# Patient Record
Sex: MALE | Race: WHITE | Employment: FULL TIME | URBAN - METROPOLITAN AREA
[De-identification: names, ages, dates, MRNs, and addresses within clinical notes are randomized per-mention and may not be internally consistent; named-entity substitution may affect disease eponyms.]

---

## 2017-01-03 RX ORDER — HYDROCODONE BITARTRATE AND ACETAMINOPHEN 5; 325 MG/1; MG/1
1 TABLET ORAL 3 TIMES DAILY PRN
Qty: 90 TABLET | Refills: 0 | Status: SHIPPED | OUTPATIENT
Start: 2017-01-03 | End: 2017-02-01 | Stop reason: SDUPTHER

## 2017-02-02 RX ORDER — HYDROCODONE BITARTRATE AND ACETAMINOPHEN 5; 325 MG/1; MG/1
1 TABLET ORAL 3 TIMES DAILY PRN
Qty: 90 TABLET | Refills: 0 | Status: SHIPPED | OUTPATIENT
Start: 2017-02-02 | End: 2017-03-02 | Stop reason: SDUPTHER

## 2017-02-21 RX ORDER — METFORMIN HYDROCHLORIDE 500 MG/1
500 TABLET, EXTENDED RELEASE ORAL 2 TIMES DAILY WITH MEALS
Qty: 180 TABLET | Refills: 3 | Status: SHIPPED | OUTPATIENT
Start: 2017-02-21 | End: 2019-02-15

## 2017-02-27 ENCOUNTER — TELEPHONE (OUTPATIENT)
Dept: INTERNAL MEDICINE CLINIC | Age: 62
End: 2017-02-27

## 2017-02-28 ENCOUNTER — OFFICE VISIT (OUTPATIENT)
Dept: INTERNAL MEDICINE CLINIC | Age: 62
End: 2017-02-28

## 2017-02-28 VITALS
DIASTOLIC BLOOD PRESSURE: 70 MMHG | BODY MASS INDEX: 40.11 KG/M2 | HEART RATE: 100 BPM | SYSTOLIC BLOOD PRESSURE: 130 MMHG | RESPIRATION RATE: 14 BRPM | WEIGHT: 263.8 LBS

## 2017-02-28 DIAGNOSIS — Z79.4 TYPE 2 DIABETES MELLITUS WITH COMPLICATION, WITH LONG-TERM CURRENT USE OF INSULIN (HCC): Primary | ICD-10-CM

## 2017-02-28 DIAGNOSIS — E11.8 TYPE 2 DIABETES MELLITUS WITH COMPLICATION, WITH LONG-TERM CURRENT USE OF INSULIN (HCC): Primary | ICD-10-CM

## 2017-02-28 DIAGNOSIS — M79.2 PERIPHERAL NEUROPATHIC PAIN: ICD-10-CM

## 2017-02-28 DIAGNOSIS — E78.2 MIXED HYPERLIPIDEMIA: ICD-10-CM

## 2017-02-28 DIAGNOSIS — I25.10 ASHD (ARTERIOSCLEROTIC HEART DISEASE): ICD-10-CM

## 2017-02-28 PROCEDURE — 99213 OFFICE O/P EST LOW 20 MIN: CPT | Performed by: INTERNAL MEDICINE

## 2017-03-02 ENCOUNTER — TELEPHONE (OUTPATIENT)
Dept: INTERNAL MEDICINE CLINIC | Age: 62
End: 2017-03-02

## 2017-03-02 DIAGNOSIS — M54.42 CHRONIC BILATERAL LOW BACK PAIN WITH LEFT-SIDED SCIATICA: Primary | ICD-10-CM

## 2017-03-02 DIAGNOSIS — G89.29 CHRONIC BILATERAL LOW BACK PAIN WITH LEFT-SIDED SCIATICA: Primary | ICD-10-CM

## 2017-03-02 RX ORDER — HYDROCODONE BITARTRATE AND ACETAMINOPHEN 5; 325 MG/1; MG/1
1 TABLET ORAL 3 TIMES DAILY PRN
Qty: 90 TABLET | Refills: 0 | Status: SHIPPED | OUTPATIENT
Start: 2017-03-02 | End: 2017-03-27 | Stop reason: SDUPTHER

## 2017-03-06 ENCOUNTER — TELEPHONE (OUTPATIENT)
Dept: INTERNAL MEDICINE CLINIC | Age: 62
End: 2017-03-06

## 2017-03-06 RX ORDER — ATORVASTATIN CALCIUM 10 MG/1
10 TABLET, FILM COATED ORAL DAILY
Qty: 90 TABLET | Refills: 3 | Status: SHIPPED | OUTPATIENT
Start: 2017-03-06

## 2017-03-27 RX ORDER — HYDROCODONE BITARTRATE AND ACETAMINOPHEN 5; 325 MG/1; MG/1
1 TABLET ORAL 3 TIMES DAILY PRN
Qty: 90 TABLET | Refills: 0 | Status: SHIPPED | OUTPATIENT
Start: 2017-03-27 | End: 2017-04-21 | Stop reason: SDUPTHER

## 2017-04-17 RX ORDER — TORSEMIDE 10 MG/1
15 TABLET ORAL DAILY
Qty: 45 TABLET | Refills: 5 | Status: CANCELLED | OUTPATIENT
Start: 2017-04-17

## 2017-04-18 RX ORDER — TORSEMIDE 10 MG/1
15 TABLET ORAL DAILY
Qty: 45 TABLET | Refills: 5 | Status: SHIPPED | OUTPATIENT
Start: 2017-04-18

## 2017-04-24 RX ORDER — HYDROCODONE BITARTRATE AND ACETAMINOPHEN 5; 325 MG/1; MG/1
1 TABLET ORAL 3 TIMES DAILY PRN
Qty: 90 TABLET | Refills: 0 | Status: SHIPPED | OUTPATIENT
Start: 2017-04-24 | End: 2017-05-22 | Stop reason: SDUPTHER

## 2017-04-26 LAB
BUN / CREAT RATIO: 17 (CALC) (ref 7–25)
BUN BLDV-MCNC: 20 MG/DL (ref 3–29)
CALCIUM SERPL-MCNC: 9.3 MG/DL (ref 8.5–10.5)
CHLORIDE BLD-SCNC: 98 MEQ/L (ref 96–110)
CO2: 28 MEQ/L (ref 19–32)
CREAT SERPL-MCNC: 1.2 MG/DL
GFR SERPL CREATININE-BSD FRML MDRD: 64 ML/MIN/1.73M2
GLUCOSE BLD-MCNC: 154 MG/DL
HBA1C MFR BLD: 9.9 % TOTAL HGB
POTASSIUM SERPL-SCNC: 4.2 MEQ/L (ref 3.4–5.3)
SODIUM BLD-SCNC: 140 MEQ/L (ref 135–148)

## 2017-05-01 ENCOUNTER — TELEPHONE (OUTPATIENT)
Dept: INTERNAL MEDICINE CLINIC | Age: 62
End: 2017-05-01

## 2017-05-02 ENCOUNTER — OFFICE VISIT (OUTPATIENT)
Dept: INTERNAL MEDICINE CLINIC | Age: 62
End: 2017-05-02

## 2017-05-02 VITALS
BODY MASS INDEX: 40.9 KG/M2 | WEIGHT: 269 LBS | DIASTOLIC BLOOD PRESSURE: 70 MMHG | HEART RATE: 88 BPM | SYSTOLIC BLOOD PRESSURE: 130 MMHG

## 2017-05-02 DIAGNOSIS — I25.10 ASHD (ARTERIOSCLEROTIC HEART DISEASE): ICD-10-CM

## 2017-05-02 DIAGNOSIS — G89.29 CHRONIC BILATERAL LOW BACK PAIN WITH LEFT-SIDED SCIATICA: ICD-10-CM

## 2017-05-02 DIAGNOSIS — E11.8 TYPE 2 DIABETES MELLITUS WITH COMPLICATION, WITH LONG-TERM CURRENT USE OF INSULIN (HCC): Primary | ICD-10-CM

## 2017-05-02 DIAGNOSIS — M54.42 CHRONIC BILATERAL LOW BACK PAIN WITH LEFT-SIDED SCIATICA: ICD-10-CM

## 2017-05-02 DIAGNOSIS — E78.2 MIXED HYPERLIPIDEMIA: ICD-10-CM

## 2017-05-02 DIAGNOSIS — Z79.4 TYPE 2 DIABETES MELLITUS WITH COMPLICATION, WITH LONG-TERM CURRENT USE OF INSULIN (HCC): Primary | ICD-10-CM

## 2017-05-02 PROCEDURE — 99213 OFFICE O/P EST LOW 20 MIN: CPT | Performed by: INTERNAL MEDICINE

## 2017-05-22 RX ORDER — HYDROCODONE BITARTRATE AND ACETAMINOPHEN 5; 325 MG/1; MG/1
1 TABLET ORAL 3 TIMES DAILY PRN
Qty: 90 TABLET | Refills: 0 | Status: SHIPPED | OUTPATIENT
Start: 2017-05-22 | End: 2017-06-19 | Stop reason: SDUPTHER

## 2017-06-05 RX ORDER — GABAPENTIN 300 MG/1
CAPSULE ORAL
Qty: 180 CAPSULE | Refills: 5 | Status: SHIPPED | OUTPATIENT
Start: 2017-06-05 | End: 2019-02-15

## 2017-06-19 RX ORDER — HYDROCODONE BITARTRATE AND ACETAMINOPHEN 5; 325 MG/1; MG/1
1 TABLET ORAL 3 TIMES DAILY PRN
Qty: 90 TABLET | Refills: 0 | Status: SHIPPED | OUTPATIENT
Start: 2017-06-19 | End: 2017-07-24 | Stop reason: SDUPTHER

## 2017-07-24 RX ORDER — HYDROCODONE BITARTRATE AND ACETAMINOPHEN 5; 325 MG/1; MG/1
1 TABLET ORAL 3 TIMES DAILY PRN
Qty: 90 TABLET | Refills: 0 | Status: SHIPPED | OUTPATIENT
Start: 2017-07-24 | End: 2017-08-23 | Stop reason: SDUPTHER

## 2017-07-31 ENCOUNTER — TELEPHONE (OUTPATIENT)
Dept: INTERNAL MEDICINE CLINIC | Age: 62
End: 2017-07-31

## 2017-07-31 DIAGNOSIS — I25.10 ASHD (ARTERIOSCLEROTIC HEART DISEASE): Primary | ICD-10-CM

## 2017-07-31 DIAGNOSIS — E11.8 TYPE 2 DIABETES MELLITUS WITH COMPLICATION, WITH LONG-TERM CURRENT USE OF INSULIN (HCC): ICD-10-CM

## 2017-07-31 DIAGNOSIS — Z79.4 TYPE 2 DIABETES MELLITUS WITH COMPLICATION, WITH LONG-TERM CURRENT USE OF INSULIN (HCC): ICD-10-CM

## 2017-07-31 DIAGNOSIS — E78.2 MIXED HYPERLIPIDEMIA: ICD-10-CM

## 2017-07-31 DIAGNOSIS — Z79.899 ENCOUNTER FOR LONG-TERM (CURRENT) USE OF HIGH-RISK MEDICATION: ICD-10-CM

## 2017-07-31 DIAGNOSIS — Z51.81 ENCOUNTER FOR THERAPEUTIC DRUG MONITORING: ICD-10-CM

## 2017-07-31 PROCEDURE — 36415 COLL VENOUS BLD VENIPUNCTURE: CPT | Performed by: INTERNAL MEDICINE

## 2017-08-01 ENCOUNTER — NURSE ONLY (OUTPATIENT)
Dept: INTERNAL MEDICINE CLINIC | Age: 62
End: 2017-08-01

## 2017-08-01 DIAGNOSIS — E78.2 MIXED HYPERLIPIDEMIA: ICD-10-CM

## 2017-08-01 DIAGNOSIS — E11.8 TYPE 2 DIABETES MELLITUS WITH COMPLICATION, WITH LONG-TERM CURRENT USE OF INSULIN (HCC): ICD-10-CM

## 2017-08-01 DIAGNOSIS — I25.10 ASHD (ARTERIOSCLEROTIC HEART DISEASE): ICD-10-CM

## 2017-08-01 DIAGNOSIS — Z79.4 TYPE 2 DIABETES MELLITUS WITH COMPLICATION, WITH LONG-TERM CURRENT USE OF INSULIN (HCC): ICD-10-CM

## 2017-08-01 LAB
ALBUMIN SERPL-MCNC: 4.1 G/DL (ref 3.4–5)
ALP BLD-CCNC: 104 U/L (ref 40–129)
ALT SERPL-CCNC: 43 U/L (ref 10–40)
ANION GAP SERPL CALCULATED.3IONS-SCNC: 16 MMOL/L (ref 3–16)
AST SERPL-CCNC: 41 U/L (ref 15–37)
BASOPHILS ABSOLUTE: 0 K/UL (ref 0–0.2)
BASOPHILS RELATIVE PERCENT: 0.6 %
BILIRUB SERPL-MCNC: 0.3 MG/DL (ref 0–1)
BILIRUBIN DIRECT: <0.2 MG/DL (ref 0–0.3)
BILIRUBIN, INDIRECT: ABNORMAL MG/DL (ref 0–1)
BUN BLDV-MCNC: 24 MG/DL (ref 7–20)
CALCIUM SERPL-MCNC: 9.2 MG/DL (ref 8.3–10.6)
CHLORIDE BLD-SCNC: 101 MMOL/L (ref 99–110)
CHOLESTEROL, TOTAL: 158 MG/DL (ref 0–199)
CO2: 26 MMOL/L (ref 21–32)
CREAT SERPL-MCNC: 1 MG/DL (ref 0.8–1.3)
EOSINOPHILS ABSOLUTE: 0.3 K/UL (ref 0–0.6)
EOSINOPHILS RELATIVE PERCENT: 4.5 %
GFR AFRICAN AMERICAN: >60
GFR NON-AFRICAN AMERICAN: >60
GLUCOSE BLD-MCNC: 269 MG/DL (ref 70–99)
HCT VFR BLD CALC: 39.6 % (ref 40.5–52.5)
HDLC SERPL-MCNC: 35 MG/DL (ref 40–60)
HEMOGLOBIN: 12.9 G/DL (ref 13.5–17.5)
LDL CHOLESTEROL CALCULATED: 84 MG/DL
LYMPHOCYTES ABSOLUTE: 1.8 K/UL (ref 1–5.1)
LYMPHOCYTES RELATIVE PERCENT: 30.9 %
MCH RBC QN AUTO: 29.2 PG (ref 26–34)
MCHC RBC AUTO-ENTMCNC: 32.5 G/DL (ref 31–36)
MCV RBC AUTO: 89.9 FL (ref 80–100)
MONOCYTES ABSOLUTE: 0.4 K/UL (ref 0–1.3)
MONOCYTES RELATIVE PERCENT: 6.7 %
NEUTROPHILS ABSOLUTE: 3.4 K/UL (ref 1.7–7.7)
NEUTROPHILS RELATIVE PERCENT: 57.3 %
PDW BLD-RTO: 15.4 % (ref 12.4–15.4)
PLATELET # BLD: 155 K/UL (ref 135–450)
PMV BLD AUTO: 8.9 FL (ref 5–10.5)
POTASSIUM SERPL-SCNC: 4.1 MMOL/L (ref 3.5–5.1)
RBC # BLD: 4.4 M/UL (ref 4.2–5.9)
SODIUM BLD-SCNC: 143 MMOL/L (ref 136–145)
TOTAL CK: 140 U/L (ref 39–308)
TOTAL PROTEIN: 6.7 G/DL (ref 6.4–8.2)
TRIGL SERPL-MCNC: 195 MG/DL (ref 0–150)
VLDLC SERPL CALC-MCNC: 39 MG/DL
WBC # BLD: 5.9 K/UL (ref 4–11)

## 2017-08-01 PROCEDURE — 36415 COLL VENOUS BLD VENIPUNCTURE: CPT | Performed by: INTERNAL MEDICINE

## 2017-08-02 LAB
ESTIMATED AVERAGE GLUCOSE: 277.6 MG/DL
HBA1C MFR BLD: 11.3 %

## 2017-08-03 ENCOUNTER — OFFICE VISIT (OUTPATIENT)
Dept: INTERNAL MEDICINE CLINIC | Age: 62
End: 2017-08-03

## 2017-08-03 VITALS
HEART RATE: 100 BPM | BODY MASS INDEX: 41.72 KG/M2 | WEIGHT: 274.4 LBS | SYSTOLIC BLOOD PRESSURE: 130 MMHG | DIASTOLIC BLOOD PRESSURE: 72 MMHG

## 2017-08-03 DIAGNOSIS — Z79.4 TYPE 2 DIABETES MELLITUS WITH COMPLICATION, WITH LONG-TERM CURRENT USE OF INSULIN (HCC): Primary | ICD-10-CM

## 2017-08-03 DIAGNOSIS — I25.10 ASHD (ARTERIOSCLEROTIC HEART DISEASE): ICD-10-CM

## 2017-08-03 DIAGNOSIS — E11.8 TYPE 2 DIABETES MELLITUS WITH COMPLICATION, WITH LONG-TERM CURRENT USE OF INSULIN (HCC): Primary | ICD-10-CM

## 2017-08-03 DIAGNOSIS — E78.2 MIXED HYPERLIPIDEMIA: ICD-10-CM

## 2017-08-03 DIAGNOSIS — M79.2 PERIPHERAL NEUROPATHIC PAIN: ICD-10-CM

## 2017-08-03 PROCEDURE — 99213 OFFICE O/P EST LOW 20 MIN: CPT | Performed by: INTERNAL MEDICINE

## 2017-08-03 ASSESSMENT — PATIENT HEALTH QUESTIONNAIRE - PHQ9
SUM OF ALL RESPONSES TO PHQ9 QUESTIONS 1 & 2: 0
1. LITTLE INTEREST OR PLEASURE IN DOING THINGS: 0
2. FEELING DOWN, DEPRESSED OR HOPELESS: 0
SUM OF ALL RESPONSES TO PHQ QUESTIONS 1-9: 0

## 2017-08-07 ENCOUNTER — TELEPHONE (OUTPATIENT)
Dept: INTERNAL MEDICINE CLINIC | Age: 62
End: 2017-08-07

## 2017-08-22 ENCOUNTER — TELEPHONE (OUTPATIENT)
Dept: INTERNAL MEDICINE CLINIC | Age: 62
End: 2017-08-22

## 2017-08-23 RX ORDER — HYDROCODONE BITARTRATE AND ACETAMINOPHEN 5; 325 MG/1; MG/1
1 TABLET ORAL 3 TIMES DAILY PRN
Qty: 90 TABLET | Refills: 0 | Status: SHIPPED | OUTPATIENT
Start: 2017-08-23

## 2017-09-12 ENCOUNTER — TELEPHONE (OUTPATIENT)
Dept: INTERNAL MEDICINE CLINIC | Age: 62
End: 2017-09-12

## 2017-09-13 ENCOUNTER — TELEPHONE (OUTPATIENT)
Dept: INTERNAL MEDICINE CLINIC | Age: 62
End: 2017-09-13

## 2017-09-13 DIAGNOSIS — Z79.4 TYPE 2 DIABETES MELLITUS WITH COMPLICATION, WITH LONG-TERM CURRENT USE OF INSULIN (HCC): Primary | ICD-10-CM

## 2017-09-13 DIAGNOSIS — E11.8 TYPE 2 DIABETES MELLITUS WITH COMPLICATION, WITH LONG-TERM CURRENT USE OF INSULIN (HCC): Primary | ICD-10-CM

## 2019-02-15 ENCOUNTER — OFFICE VISIT (OUTPATIENT)
Dept: CARDIOLOGY CLINIC | Age: 64
End: 2019-02-15
Payer: MEDICARE

## 2019-02-15 VITALS
BODY MASS INDEX: 39.56 KG/M2 | DIASTOLIC BLOOD PRESSURE: 80 MMHG | HEIGHT: 68 IN | WEIGHT: 261 LBS | SYSTOLIC BLOOD PRESSURE: 134 MMHG | HEART RATE: 98 BPM

## 2019-02-15 DIAGNOSIS — I25.10 CORONARY ARTERY DISEASE INVOLVING NATIVE HEART WITHOUT ANGINA PECTORIS, UNSPECIFIED VESSEL OR LESION TYPE: Primary | ICD-10-CM

## 2019-02-15 DIAGNOSIS — E78.2 MIXED HYPERLIPIDEMIA: ICD-10-CM

## 2019-02-15 PROCEDURE — G8598 ASA/ANTIPLAT THER USED: HCPCS | Performed by: INTERNAL MEDICINE

## 2019-02-15 PROCEDURE — 99214 OFFICE O/P EST MOD 30 MIN: CPT | Performed by: INTERNAL MEDICINE

## 2019-02-15 PROCEDURE — G8417 CALC BMI ABV UP PARAM F/U: HCPCS | Performed by: INTERNAL MEDICINE

## 2019-02-15 PROCEDURE — G8427 DOCREV CUR MEDS BY ELIG CLIN: HCPCS | Performed by: INTERNAL MEDICINE

## 2019-02-15 PROCEDURE — G8484 FLU IMMUNIZE NO ADMIN: HCPCS | Performed by: INTERNAL MEDICINE

## 2019-02-15 PROCEDURE — 93000 ELECTROCARDIOGRAM COMPLETE: CPT | Performed by: INTERNAL MEDICINE

## 2019-02-15 PROCEDURE — 1036F TOBACCO NON-USER: CPT | Performed by: INTERNAL MEDICINE

## 2019-02-15 PROCEDURE — 3017F COLORECTAL CA SCREEN DOC REV: CPT | Performed by: INTERNAL MEDICINE

## 2019-02-15 RX ORDER — FERROUS SULFATE 325(65) MG
325 TABLET ORAL
COMMUNITY

## 2019-02-15 RX ORDER — GLIPIZIDE 5 MG/1
5 TABLET ORAL
COMMUNITY

## 2019-02-15 RX ORDER — GABAPENTIN 800 MG/1
800 TABLET ORAL 4 TIMES DAILY
COMMUNITY

## 2019-02-15 RX ORDER — LEVOTHYROXINE SODIUM 0.1 MG/1
100 TABLET ORAL DAILY
COMMUNITY

## 2019-02-20 ENCOUNTER — PROCEDURE VISIT (OUTPATIENT)
Dept: CARDIOLOGY CLINIC | Age: 64
End: 2019-02-20
Payer: MEDICARE

## 2019-02-20 VITALS
SYSTOLIC BLOOD PRESSURE: 138 MMHG | BODY MASS INDEX: 39.56 KG/M2 | HEART RATE: 109 BPM | HEIGHT: 68 IN | DIASTOLIC BLOOD PRESSURE: 70 MMHG | WEIGHT: 261 LBS

## 2019-02-20 DIAGNOSIS — R94.31 ABNORMAL EKG: ICD-10-CM

## 2019-02-20 DIAGNOSIS — E78.2 MIXED HYPERLIPIDEMIA: ICD-10-CM

## 2019-02-20 DIAGNOSIS — I25.10 CORONARY ARTERY DISEASE INVOLVING NATIVE HEART WITHOUT ANGINA PECTORIS, UNSPECIFIED VESSEL OR LESION TYPE: Primary | ICD-10-CM

## 2019-02-20 PROCEDURE — 93015 CV STRESS TEST SUPVJ I&R: CPT | Performed by: INTERNAL MEDICINE

## 2019-02-26 ENCOUNTER — TELEPHONE (OUTPATIENT)
Dept: CARDIOLOGY CLINIC | Age: 64
End: 2019-02-26

## 2019-06-10 ENCOUNTER — TELEPHONE (OUTPATIENT)
Dept: CARDIOLOGY CLINIC | Age: 64
End: 2019-06-10

## 2025-02-04 ENCOUNTER — APPOINTMENT (OUTPATIENT)
Dept: GENERAL RADIOLOGY | Age: 70
End: 2025-02-04
Payer: OTHER GOVERNMENT

## 2025-02-04 ENCOUNTER — APPOINTMENT (OUTPATIENT)
Dept: CT IMAGING | Age: 70
End: 2025-02-04
Payer: OTHER GOVERNMENT

## 2025-02-04 ENCOUNTER — HOSPITAL ENCOUNTER (EMERGENCY)
Age: 70
Discharge: ANOTHER HOSPITAL | End: 2025-02-05
Attending: EMERGENCY MEDICINE
Payer: OTHER GOVERNMENT

## 2025-02-04 DIAGNOSIS — R50.9 FEVER, UNSPECIFIED FEVER CAUSE: ICD-10-CM

## 2025-02-04 DIAGNOSIS — R41.82 ALTERED MENTAL STATUS, UNSPECIFIED ALTERED MENTAL STATUS TYPE: ICD-10-CM

## 2025-02-04 DIAGNOSIS — A41.9 SEPTICEMIA (HCC): Primary | ICD-10-CM
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
## 2025-02-04 LAB
ALBUMIN SERPL-MCNC: 4 G/DL (ref 3.4–5)
ALBUMIN/GLOB SERPL: 1.5 {RATIO} (ref 1.1–2.2)
ALP SERPL-CCNC: 92 U/L (ref 40–129)
ALT SERPL-CCNC: 49 U/L (ref 10–40)
AMPHET UR QL SCN: NEGATIVE
ANION GAP SERPL CALCULATED.3IONS-SCNC: 17 MMOL/L (ref 4–16)
AST SERPL-CCNC: 100 U/L (ref 15–37)
BARBITURATES UR QL SCN: NEGATIVE
BASOPHILS # BLD: 0.03 K/UL
BASOPHILS NFR BLD: 1 % (ref 0–1)
BENZODIAZ UR QL: NEGATIVE
BILIRUB SERPL-MCNC: 0.7 MG/DL (ref 0–1)
BILIRUB UR QL STRIP: NEGATIVE
BNP SERPL-MCNC: 77 PG/ML (ref 0–125)
BUN SERPL-MCNC: 20 MG/DL (ref 6–23)
CALCIUM SERPL-MCNC: 9.3 MG/DL (ref 8.3–10.6)
CANNABINOIDS UR QL SCN: NEGATIVE
CHLORIDE SERPL-SCNC: 100 MMOL/L (ref 99–110)
CK SERPL-CCNC: 479 U/L (ref 38–174)
CLARITY UR: CLEAR
CO2 SERPL-SCNC: 23 MMOL/L (ref 21–32)
COCAINE UR QL SCN: NEGATIVE
COLOR UR: YELLOW
CREAT SERPL-MCNC: 1.2 MG/DL (ref 0.9–1.3)
EOSINOPHIL # BLD: 0.03 K/UL
EOSINOPHILS RELATIVE PERCENT: 1 % (ref 0–3)
ERYTHROCYTE [DISTWIDTH] IN BLOOD BY AUTOMATED COUNT: 14.4 % (ref 11.7–14.9)
ETHANOLAMINE SERPL-MCNC: <10 MG/DL (ref 0–0.08)
FENTANYL UR QL: NEGATIVE
GFR, ESTIMATED: 65 ML/MIN/1.73M2
GLUCOSE SERPL-MCNC: 186 MG/DL (ref 74–99)
GLUCOSE UR STRIP-MCNC: NEGATIVE MG/DL
HCO3 VENOUS: 25.6 MMOL/L (ref 22–29)
HCT VFR BLD AUTO: 37.4 % (ref 42–52)
HGB BLD-MCNC: 12.4 G/DL (ref 13.5–18)
HGB UR QL STRIP.AUTO: ABNORMAL
IMM GRANULOCYTES # BLD AUTO: 0.02 K/UL
IMM GRANULOCYTES NFR BLD: 0 %
INFLUENZA A BY PCR: NOT DETECTED
INFLUENZA B BY PCR: NOT DETECTED
INR PPP: 1.1
KETONES UR STRIP-MCNC: NEGATIVE MG/DL
LACTATE BLDV-SCNC: 4 MMOL/L (ref 0.4–2)
LEUKOCYTE ESTERASE UR QL STRIP: NEGATIVE
LIPASE SERPL-CCNC: 19 U/L (ref 13–60)
LYMPHOCYTES NFR BLD: 0.77 K/UL
LYMPHOCYTES RELATIVE PERCENT: 13 % (ref 24–44)
MCH RBC QN AUTO: 29.2 PG (ref 27–31)
MCHC RBC AUTO-ENTMCNC: 33.2 G/DL (ref 32–36)
MCV RBC AUTO: 88 FL (ref 78–100)
MONOCYTES NFR BLD: 0.51 K/UL
MONOCYTES NFR BLD: 9 % (ref 0–4)
NEUTROPHILS NFR BLD: 77 % (ref 36–66)
NEUTS SEG NFR BLD: 4.44 K/UL
NITRITE UR QL STRIP: NEGATIVE
O2 SAT, VEN: 58.7 % (ref 34–95)
OPIATES UR QL SCN: POSITIVE
OXYCODONE UR QL SCN: NEGATIVE
PARTIAL THROMBOPLASTIN TIME: 32 SEC (ref 25.1–37.1)
PCO2 VENOUS: 40.1 MM HG (ref 41–51)
PH UR STRIP: 7 [PH] (ref 5–8)
PH VENOUS: 7.41 (ref 7.32–7.43)
PLATELET # BLD AUTO: 122 K/UL (ref 140–440)
PMV BLD AUTO: 10.5 FL (ref 7.5–11.1)
PO2 VENOUS: 30.2 MM HG (ref 28–48)
POSITIVE BASE EXCESS, VEN: 0.9 MMOL/L (ref 0–3)
POTASSIUM SERPL-SCNC: 4.2 MMOL/L (ref 3.5–5.1)
PROT SERPL-MCNC: 6.6 G/DL (ref 6.4–8.2)
PROT UR STRIP-MCNC: ABNORMAL MG/DL
PROTHROMBIN TIME: 14.8 SEC (ref 11.7–14.5)
RBC # BLD AUTO: 4.25 M/UL (ref 4.6–6.2)
RBC #/AREA URNS HPF: ABNORMAL /HPF
SARS-COV-2 RDRP RESP QL NAA+PROBE: NOT DETECTED
SODIUM SERPL-SCNC: 140 MMOL/L (ref 135–145)
SP GR UR STRIP: 1.02 (ref 1–1.03)
SPECIMEN DESCRIPTION: NORMAL
TCO2 CALC VENOUS: 27 MMOL/L (ref 21–32)
TEST INFORMATION: ABNORMAL
TROPONIN I SERPL HS-MCNC: 21 NG/L (ref 0–21)
TROPONIN I SERPL HS-MCNC: 22 NG/L (ref 0–21)
TSH SERPL DL<=0.05 MIU/L-ACNC: 4.32 UIU/ML (ref 0.27–4.2)
UROBILINOGEN UR STRIP-ACNC: 0.2 EU/DL (ref 0–1)
WBC #/AREA URNS HPF: ABNORMAL /HPF
WBC OTHER # BLD: 5.8 K/UL (ref 4–10.5)

## 2025-02-04 PROCEDURE — 84443 ASSAY THYROID STIM HORMONE: CPT

## 2025-02-04 PROCEDURE — 85730 THROMBOPLASTIN TIME PARTIAL: CPT

## 2025-02-04 PROCEDURE — 2580000003 HC RX 258: Performed by: EMERGENCY MEDICINE

## 2025-02-04 PROCEDURE — 99285 EMERGENCY DEPT VISIT HI MDM: CPT

## 2025-02-04 PROCEDURE — 85025 COMPLETE CBC W/AUTO DIFF WBC: CPT

## 2025-02-04 PROCEDURE — 70450 CT HEAD/BRAIN W/O DYE: CPT

## 2025-02-04 PROCEDURE — 71045 X-RAY EXAM CHEST 1 VIEW: CPT

## 2025-02-04 PROCEDURE — 83690 ASSAY OF LIPASE: CPT

## 2025-02-04 PROCEDURE — 87502 INFLUENZA DNA AMP PROBE: CPT

## 2025-02-04 PROCEDURE — 87040 BLOOD CULTURE FOR BACTERIA: CPT

## 2025-02-04 PROCEDURE — 82803 BLOOD GASES ANY COMBINATION: CPT

## 2025-02-04 PROCEDURE — G0480 DRUG TEST DEF 1-7 CLASSES: HCPCS

## 2025-02-04 PROCEDURE — 87086 URINE CULTURE/COLONY COUNT: CPT

## 2025-02-04 PROCEDURE — 93005 ELECTROCARDIOGRAM TRACING: CPT | Performed by: EMERGENCY MEDICINE

## 2025-02-04 PROCEDURE — 81001 URINALYSIS AUTO W/SCOPE: CPT

## 2025-02-04 PROCEDURE — 80053 COMPREHEN METABOLIC PANEL: CPT

## 2025-02-04 PROCEDURE — 84484 ASSAY OF TROPONIN QUANT: CPT

## 2025-02-04 PROCEDURE — 82550 ASSAY OF CK (CPK): CPT

## 2025-02-04 PROCEDURE — 72170 X-RAY EXAM OF PELVIS: CPT

## 2025-02-04 PROCEDURE — 87635 SARS-COV-2 COVID-19 AMP PRB: CPT

## 2025-02-04 PROCEDURE — 85610 PROTHROMBIN TIME: CPT

## 2025-02-04 PROCEDURE — 80307 DRUG TEST PRSMV CHEM ANLYZR: CPT

## 2025-02-04 PROCEDURE — 83880 ASSAY OF NATRIURETIC PEPTIDE: CPT

## 2025-02-04 PROCEDURE — 72125 CT NECK SPINE W/O DYE: CPT

## 2025-02-04 PROCEDURE — 96361 HYDRATE IV INFUSION ADD-ON: CPT

## 2025-02-04 PROCEDURE — 83605 ASSAY OF LACTIC ACID: CPT

## 2025-02-04 RX ORDER — 0.9 % SODIUM CHLORIDE 0.9 %
1000 INTRAVENOUS SOLUTION INTRAVENOUS ONCE
Status: COMPLETED | OUTPATIENT
Start: 2025-02-04 | End: 2025-02-05

## 2025-02-04 RX ORDER — PREGABALIN 150 MG/1
150 CAPSULE ORAL 2 TIMES DAILY
Status: ON HOLD | COMMUNITY
Start: 2025-01-12

## 2025-02-04 RX ADMIN — SODIUM CHLORIDE 1000 ML: 9 INJECTION, SOLUTION INTRAVENOUS at 22:21

## 2025-02-05 ENCOUNTER — APPOINTMENT (OUTPATIENT)
Dept: MRI IMAGING | Age: 70
DRG: 947 | End: 2025-02-05
Attending: INTERNAL MEDICINE
Payer: OTHER GOVERNMENT

## 2025-02-05 ENCOUNTER — APPOINTMENT (OUTPATIENT)
Dept: NON INVASIVE DIAGNOSTICS | Age: 70
DRG: 947 | End: 2025-02-05
Attending: INTERNAL MEDICINE
Payer: OTHER GOVERNMENT

## 2025-02-05 ENCOUNTER — HOSPITAL ENCOUNTER (INPATIENT)
Age: 70
LOS: 4 days | Discharge: NURSING, REHAB OR LONG-TERM CARE | DRG: 947 | End: 2025-02-09
Attending: INTERNAL MEDICINE | Admitting: INTERNAL MEDICINE
Payer: OTHER GOVERNMENT

## 2025-02-05 VITALS
RESPIRATION RATE: 24 BRPM | WEIGHT: 261 LBS | TEMPERATURE: 101.9 F | OXYGEN SATURATION: 93 % | SYSTOLIC BLOOD PRESSURE: 167 MMHG | DIASTOLIC BLOOD PRESSURE: 77 MMHG | HEART RATE: 115 BPM | BODY MASS INDEX: 39.68 KG/M2

## 2025-02-05 DIAGNOSIS — R55 SYNCOPE, UNSPECIFIED SYNCOPE TYPE: Primary | ICD-10-CM

## 2025-02-05 PROBLEM — R41.82 AMS (ALTERED MENTAL STATUS): Status: ACTIVE | Noted: 2025-02-05

## 2025-02-05 LAB
ANION GAP SERPL CALCULATED.3IONS-SCNC: 9 MMOL/L (ref 9–17)
B PARAP IS1001 DNA NPH QL NAA+NON-PROBE: NOT DETECTED
B PERT DNA SPEC QL NAA+PROBE: NOT DETECTED
BASOPHILS # BLD: 0.03 K/UL
BASOPHILS NFR BLD: 1 % (ref 0–1)
BUN SERPL-MCNC: 18 MG/DL (ref 7–20)
C PNEUM DNA NPH QL NAA+NON-PROBE: NOT DETECTED
CALCIUM SERPL-MCNC: 8.4 MG/DL (ref 8.3–10.6)
CHLORIDE SERPL-SCNC: 108 MMOL/L (ref 99–110)
CO2 SERPL-SCNC: 24 MMOL/L (ref 21–32)
CREAT SERPL-MCNC: 1.2 MG/DL (ref 0.8–1.3)
EKG ATRIAL RATE: 113 BPM
EKG DIAGNOSIS: NORMAL
EKG P AXIS: 12 DEGREES
EKG P-R INTERVAL: 170 MS
EKG Q-T INTERVAL: 334 MS
EKG QRS DURATION: 82 MS
EKG QTC CALCULATION (BAZETT): 458 MS
EKG R AXIS: -10 DEGREES
EKG T AXIS: 16 DEGREES
EKG VENTRICULAR RATE: 113 BPM
EOSINOPHIL # BLD: 0.04 K/UL
EOSINOPHILS RELATIVE PERCENT: 1 % (ref 0–3)
ERYTHROCYTE [DISTWIDTH] IN BLOOD BY AUTOMATED COUNT: 14.4 % (ref 11.7–14.9)
FLUAV RNA NPH QL NAA+NON-PROBE: NOT DETECTED
FLUBV RNA NPH QL NAA+NON-PROBE: NOT DETECTED
GFR, ESTIMATED: 60 ML/MIN/1.73M2
GLUCOSE BLD-MCNC: 106 MG/DL (ref 74–99)
GLUCOSE BLD-MCNC: 106 MG/DL (ref 74–99)
GLUCOSE BLD-MCNC: 129 MG/DL (ref 74–99)
GLUCOSE BLD-MCNC: 137 MG/DL (ref 74–99)
GLUCOSE BLD-MCNC: 147 MG/DL (ref 74–99)
GLUCOSE SERPL-MCNC: 100 MG/DL (ref 74–99)
HADV DNA NPH QL NAA+NON-PROBE: NOT DETECTED
HCOV 229E RNA NPH QL NAA+NON-PROBE: NOT DETECTED
HCOV HKU1 RNA NPH QL NAA+NON-PROBE: NOT DETECTED
HCOV NL63 RNA NPH QL NAA+NON-PROBE: NOT DETECTED
HCOV OC43 RNA NPH QL NAA+NON-PROBE: NOT DETECTED
HCT VFR BLD AUTO: 34.6 % (ref 42–52)
HGB BLD-MCNC: 11.1 G/DL (ref 13.5–18)
HMPV RNA NPH QL NAA+NON-PROBE: NOT DETECTED
HPIV1 RNA NPH QL NAA+NON-PROBE: NOT DETECTED
HPIV2 RNA NPH QL NAA+NON-PROBE: NOT DETECTED
HPIV3 RNA NPH QL NAA+NON-PROBE: NOT DETECTED
HPIV4 RNA NPH QL NAA+NON-PROBE: NOT DETECTED
IMM GRANULOCYTES # BLD AUTO: 0.01 K/UL
IMM GRANULOCYTES NFR BLD: 0 %
LACTATE BLDV-SCNC: 1.1 MMOL/L (ref 0.4–2)
LACTATE BLDV-SCNC: 2.5 MMOL/L (ref 0.4–2)
LYMPHOCYTES NFR BLD: 1.07 K/UL
LYMPHOCYTES RELATIVE PERCENT: 27 % (ref 24–44)
M PNEUMO DNA NPH QL NAA+NON-PROBE: NOT DETECTED
MCH RBC QN AUTO: 28.9 PG (ref 27–31)
MCHC RBC AUTO-ENTMCNC: 32.1 G/DL (ref 32–36)
MCV RBC AUTO: 90.1 FL (ref 78–100)
MONOCYTES NFR BLD: 0.54 K/UL
MONOCYTES NFR BLD: 14 % (ref 0–4)
MRSA, DNA, NASAL: NOT DETECTED
NEUTROPHILS NFR BLD: 57 % (ref 36–66)
NEUTS SEG NFR BLD: 2.28 K/UL
PLATELET # BLD AUTO: 110 K/UL (ref 140–440)
PMV BLD AUTO: 10.1 FL (ref 7.5–11.1)
POTASSIUM SERPL-SCNC: 3.7 MMOL/L (ref 3.5–5.1)
PROCALCITONIN SERPL-MCNC: 0.3 NG/ML
RBC # BLD AUTO: 3.84 M/UL (ref 4.6–6.2)
RSV RNA NPH QL NAA+NON-PROBE: NOT DETECTED
RV+EV RNA NPH QL NAA+NON-PROBE: NOT DETECTED
SARS-COV-2 RNA NPH QL NAA+NON-PROBE: NOT DETECTED
SODIUM SERPL-SCNC: 142 MMOL/L (ref 136–145)
SPECIMEN DESCRIPTION: NORMAL
SPECIMEN DESCRIPTION: NORMAL
WBC OTHER # BLD: 4 K/UL (ref 4–10.5)

## 2025-02-05 PROCEDURE — 96365 THER/PROPH/DIAG IV INF INIT: CPT

## 2025-02-05 PROCEDURE — 0202U NFCT DS 22 TRGT SARS-COV-2: CPT

## 2025-02-05 PROCEDURE — 95819 EEG AWAKE AND ASLEEP: CPT | Performed by: STUDENT IN AN ORGANIZED HEALTH CARE EDUCATION/TRAINING PROGRAM

## 2025-02-05 PROCEDURE — 80048 BASIC METABOLIC PNL TOTAL CA: CPT

## 2025-02-05 PROCEDURE — 6370000000 HC RX 637 (ALT 250 FOR IP): Performed by: EMERGENCY MEDICINE

## 2025-02-05 PROCEDURE — 36415 COLL VENOUS BLD VENIPUNCTURE: CPT

## 2025-02-05 PROCEDURE — 84145 PROCALCITONIN (PCT): CPT

## 2025-02-05 PROCEDURE — A9579 GAD-BASE MR CONTRAST NOS,1ML: HCPCS | Performed by: CLINICAL NURSE SPECIALIST

## 2025-02-05 PROCEDURE — 96375 TX/PRO/DX INJ NEW DRUG ADDON: CPT

## 2025-02-05 PROCEDURE — 2580000003 HC RX 258: Performed by: INTERNAL MEDICINE

## 2025-02-05 PROCEDURE — 6370000000 HC RX 637 (ALT 250 FOR IP): Performed by: NURSE PRACTITIONER

## 2025-02-05 PROCEDURE — 2500000003 HC RX 250 WO HCPCS: Performed by: INTERNAL MEDICINE

## 2025-02-05 PROCEDURE — 87641 MR-STAPH DNA AMP PROBE: CPT

## 2025-02-05 PROCEDURE — 2580000003 HC RX 258: Performed by: EMERGENCY MEDICINE

## 2025-02-05 PROCEDURE — 95819 EEG AWAKE AND ASLEEP: CPT

## 2025-02-05 PROCEDURE — 70553 MRI BRAIN STEM W/O & W/DYE: CPT

## 2025-02-05 PROCEDURE — 85025 COMPLETE CBC W/AUTO DIFF WBC: CPT

## 2025-02-05 PROCEDURE — 1200000000 HC SEMI PRIVATE

## 2025-02-05 PROCEDURE — 94761 N-INVAS EAR/PLS OXIMETRY MLT: CPT

## 2025-02-05 PROCEDURE — 83605 ASSAY OF LACTIC ACID: CPT

## 2025-02-05 PROCEDURE — 2700000000 HC OXYGEN THERAPY PER DAY

## 2025-02-05 PROCEDURE — 6360000004 HC RX CONTRAST MEDICATION: Performed by: CLINICAL NURSE SPECIALIST

## 2025-02-05 PROCEDURE — 6360000002 HC RX W HCPCS: Performed by: EMERGENCY MEDICINE

## 2025-02-05 PROCEDURE — 99223 1ST HOSP IP/OBS HIGH 75: CPT | Performed by: PSYCHIATRY & NEUROLOGY

## 2025-02-05 PROCEDURE — 93010 ELECTROCARDIOGRAM REPORT: CPT | Performed by: INTERNAL MEDICINE

## 2025-02-05 PROCEDURE — 6360000002 HC RX W HCPCS: Performed by: INTERNAL MEDICINE

## 2025-02-05 PROCEDURE — 6360000002 HC RX W HCPCS: Performed by: NURSE PRACTITIONER

## 2025-02-05 PROCEDURE — 5A0935A ASSISTANCE WITH RESPIRATORY VENTILATION, LESS THAN 24 CONSECUTIVE HOURS, HIGH NASAL FLOW/VELOCITY: ICD-10-PCS | Performed by: INTERNAL MEDICINE

## 2025-02-05 PROCEDURE — 82962 GLUCOSE BLOOD TEST: CPT

## 2025-02-05 RX ORDER — ENOXAPARIN SODIUM 100 MG/ML
30 INJECTION SUBCUTANEOUS 2 TIMES DAILY
Status: DISCONTINUED | OUTPATIENT
Start: 2025-02-05 | End: 2025-02-09 | Stop reason: HOSPADM

## 2025-02-05 RX ORDER — GLUCAGON 1 MG/ML
1 KIT INJECTION PRN
Status: DISCONTINUED | OUTPATIENT
Start: 2025-02-05 | End: 2025-02-05 | Stop reason: SDUPTHER

## 2025-02-05 RX ORDER — INSULIN LISPRO 100 [IU]/ML
0-8 INJECTION, SOLUTION INTRAVENOUS; SUBCUTANEOUS
Status: DISCONTINUED | OUTPATIENT
Start: 2025-02-05 | End: 2025-02-05 | Stop reason: SDUPTHER

## 2025-02-05 RX ORDER — DEXTROSE MONOHYDRATE 100 MG/ML
INJECTION, SOLUTION INTRAVENOUS CONTINUOUS PRN
Status: DISCONTINUED | OUTPATIENT
Start: 2025-02-05 | End: 2025-02-09 | Stop reason: HOSPADM

## 2025-02-05 RX ORDER — SODIUM CHLORIDE 0.9 % (FLUSH) 0.9 %
5-40 SYRINGE (ML) INJECTION EVERY 12 HOURS SCHEDULED
Status: DISCONTINUED | OUTPATIENT
Start: 2025-02-05 | End: 2025-02-09 | Stop reason: HOSPADM

## 2025-02-05 RX ORDER — POTASSIUM CHLORIDE 1500 MG/1
40 TABLET, EXTENDED RELEASE ORAL PRN
Status: DISCONTINUED | OUTPATIENT
Start: 2025-02-05 | End: 2025-02-09 | Stop reason: HOSPADM

## 2025-02-05 RX ORDER — ACETAMINOPHEN 650 MG/1
650 SUPPOSITORY RECTAL EVERY 6 HOURS PRN
Status: DISCONTINUED | OUTPATIENT
Start: 2025-02-05 | End: 2025-02-09 | Stop reason: HOSPADM

## 2025-02-05 RX ORDER — INSULIN GLARGINE 100 [IU]/ML
30 INJECTION, SOLUTION SUBCUTANEOUS NIGHTLY
Status: DISCONTINUED | OUTPATIENT
Start: 2025-02-05 | End: 2025-02-09 | Stop reason: HOSPADM

## 2025-02-05 RX ORDER — GLUCAGON 1 MG/ML
1 KIT INJECTION PRN
Status: DISCONTINUED | OUTPATIENT
Start: 2025-02-05 | End: 2025-02-09 | Stop reason: HOSPADM

## 2025-02-05 RX ORDER — VITAMIN B COMPLEX
1000 TABLET ORAL NIGHTLY
Status: DISCONTINUED | OUTPATIENT
Start: 2025-02-05 | End: 2025-02-09 | Stop reason: HOSPADM

## 2025-02-05 RX ORDER — INSULIN LISPRO 100 [IU]/ML
0-8 INJECTION, SOLUTION INTRAVENOUS; SUBCUTANEOUS
Status: DISCONTINUED | OUTPATIENT
Start: 2025-02-05 | End: 2025-02-09 | Stop reason: HOSPADM

## 2025-02-05 RX ORDER — ONDANSETRON 2 MG/ML
4 INJECTION INTRAMUSCULAR; INTRAVENOUS EVERY 6 HOURS PRN
Status: DISCONTINUED | OUTPATIENT
Start: 2025-02-05 | End: 2025-02-09 | Stop reason: HOSPADM

## 2025-02-05 RX ORDER — SODIUM CHLORIDE 9 MG/ML
INJECTION, SOLUTION INTRAVENOUS PRN
Status: DISCONTINUED | OUTPATIENT
Start: 2025-02-05 | End: 2025-02-09 | Stop reason: HOSPADM

## 2025-02-05 RX ORDER — GABAPENTIN 800 MG/1
800 TABLET ORAL 3 TIMES DAILY
Status: DISCONTINUED | OUTPATIENT
Start: 2025-02-05 | End: 2025-02-05

## 2025-02-05 RX ORDER — ENOXAPARIN SODIUM 100 MG/ML
30 INJECTION SUBCUTANEOUS 2 TIMES DAILY
Status: DISCONTINUED | OUTPATIENT
Start: 2025-02-05 | End: 2025-02-05 | Stop reason: SDUPTHER

## 2025-02-05 RX ORDER — VANCOMYCIN 2 G/400ML
2000 INJECTION, SOLUTION INTRAVENOUS ONCE
Status: COMPLETED | OUTPATIENT
Start: 2025-02-05 | End: 2025-02-05

## 2025-02-05 RX ORDER — PREGABALIN 75 MG/1
150 CAPSULE ORAL 2 TIMES DAILY
Status: DISCONTINUED | OUTPATIENT
Start: 2025-02-05 | End: 2025-02-09 | Stop reason: HOSPADM

## 2025-02-05 RX ORDER — ACETAMINOPHEN 650 MG/1
650 SUPPOSITORY RECTAL ONCE
Status: COMPLETED | OUTPATIENT
Start: 2025-02-05 | End: 2025-02-05

## 2025-02-05 RX ORDER — ACETAMINOPHEN 650 MG/1
650 SUPPOSITORY RECTAL EVERY 6 HOURS PRN
Status: DISCONTINUED | OUTPATIENT
Start: 2025-02-05 | End: 2025-02-05 | Stop reason: SDUPTHER

## 2025-02-05 RX ORDER — MAGNESIUM SULFATE IN WATER 40 MG/ML
2000 INJECTION, SOLUTION INTRAVENOUS PRN
Status: DISCONTINUED | OUTPATIENT
Start: 2025-02-05 | End: 2025-02-09 | Stop reason: HOSPADM

## 2025-02-05 RX ORDER — POLYETHYLENE GLYCOL 3350 17 G/17G
17 POWDER, FOR SOLUTION ORAL DAILY PRN
Status: DISCONTINUED | OUTPATIENT
Start: 2025-02-05 | End: 2025-02-09 | Stop reason: HOSPADM

## 2025-02-05 RX ORDER — ASPIRIN 81 MG/1
81 TABLET, CHEWABLE ORAL NIGHTLY
Status: DISCONTINUED | OUTPATIENT
Start: 2025-02-05 | End: 2025-02-09 | Stop reason: HOSPADM

## 2025-02-05 RX ORDER — LEVOTHYROXINE SODIUM 100 UG/1
100 TABLET ORAL DAILY
Status: DISCONTINUED | OUTPATIENT
Start: 2025-02-05 | End: 2025-02-09 | Stop reason: HOSPADM

## 2025-02-05 RX ORDER — TORSEMIDE 20 MG/1
15 TABLET ORAL NIGHTLY
Status: DISCONTINUED | OUTPATIENT
Start: 2025-02-05 | End: 2025-02-09 | Stop reason: HOSPADM

## 2025-02-05 RX ORDER — SODIUM CHLORIDE 9 MG/ML
INJECTION, SOLUTION INTRAVENOUS PRN
Status: DISCONTINUED | OUTPATIENT
Start: 2025-02-05 | End: 2025-02-05 | Stop reason: SDUPTHER

## 2025-02-05 RX ORDER — ACETAMINOPHEN 325 MG/1
650 TABLET ORAL EVERY 6 HOURS PRN
Status: DISCONTINUED | OUTPATIENT
Start: 2025-02-05 | End: 2025-02-05 | Stop reason: SDUPTHER

## 2025-02-05 RX ORDER — BISACODYL 5 MG/1
5 TABLET, DELAYED RELEASE ORAL DAILY PRN
Status: DISCONTINUED | OUTPATIENT
Start: 2025-02-05 | End: 2025-02-09 | Stop reason: HOSPADM

## 2025-02-05 RX ORDER — SODIUM CHLORIDE 9 MG/ML
INJECTION, SOLUTION INTRAVENOUS CONTINUOUS
Status: DISPENSED | OUTPATIENT
Start: 2025-02-05 | End: 2025-02-07

## 2025-02-05 RX ORDER — ACETAMINOPHEN 325 MG/1
650 TABLET ORAL EVERY 6 HOURS PRN
Status: DISCONTINUED | OUTPATIENT
Start: 2025-02-05 | End: 2025-02-09 | Stop reason: HOSPADM

## 2025-02-05 RX ORDER — KETOROLAC TROMETHAMINE 15 MG/ML
15 INJECTION, SOLUTION INTRAMUSCULAR; INTRAVENOUS ONCE
Status: COMPLETED | OUTPATIENT
Start: 2025-02-05 | End: 2025-02-05

## 2025-02-05 RX ORDER — 0.9 % SODIUM CHLORIDE 0.9 %
1000 INTRAVENOUS SOLUTION INTRAVENOUS ONCE
Status: COMPLETED | OUTPATIENT
Start: 2025-02-05 | End: 2025-02-05

## 2025-02-05 RX ORDER — POTASSIUM CHLORIDE 7.45 MG/ML
10 INJECTION INTRAVENOUS PRN
Status: DISCONTINUED | OUTPATIENT
Start: 2025-02-05 | End: 2025-02-09 | Stop reason: HOSPADM

## 2025-02-05 RX ORDER — SODIUM CHLORIDE 0.9 % (FLUSH) 0.9 %
5-40 SYRINGE (ML) INJECTION PRN
Status: DISCONTINUED | OUTPATIENT
Start: 2025-02-05 | End: 2025-02-09 | Stop reason: HOSPADM

## 2025-02-05 RX ORDER — ATORVASTATIN CALCIUM 10 MG/1
20 TABLET, FILM COATED ORAL NIGHTLY
Status: DISCONTINUED | OUTPATIENT
Start: 2025-02-05 | End: 2025-02-09 | Stop reason: HOSPADM

## 2025-02-05 RX ORDER — FERROUS SULFATE 325(65) MG
325 TABLET ORAL
Status: DISCONTINUED | OUTPATIENT
Start: 2025-02-06 | End: 2025-02-09 | Stop reason: HOSPADM

## 2025-02-05 RX ORDER — ONDANSETRON 4 MG/1
4 TABLET, ORALLY DISINTEGRATING ORAL EVERY 8 HOURS PRN
Status: DISCONTINUED | OUTPATIENT
Start: 2025-02-05 | End: 2025-02-09 | Stop reason: HOSPADM

## 2025-02-05 RX ORDER — PREGABALIN 75 MG/1
150 CAPSULE ORAL DAILY
Status: DISCONTINUED | OUTPATIENT
Start: 2025-02-05 | End: 2025-02-05

## 2025-02-05 RX ORDER — TAMSULOSIN HYDROCHLORIDE 0.4 MG/1
0.4 CAPSULE ORAL NIGHTLY
Status: DISCONTINUED | OUTPATIENT
Start: 2025-02-05 | End: 2025-02-09 | Stop reason: HOSPADM

## 2025-02-05 RX ORDER — HYDROCODONE BITARTRATE AND ACETAMINOPHEN 5; 325 MG/1; MG/1
1 TABLET ORAL 3 TIMES DAILY PRN
Status: DISCONTINUED | OUTPATIENT
Start: 2025-02-05 | End: 2025-02-09 | Stop reason: HOSPADM

## 2025-02-05 RX ADMIN — ONDANSETRON 4 MG: 2 INJECTION INTRAMUSCULAR; INTRAVENOUS at 20:42

## 2025-02-05 RX ADMIN — GADOTERIDOL 20 ML: 279.3 INJECTION, SOLUTION INTRAVENOUS at 15:38

## 2025-02-05 RX ADMIN — ATORVASTATIN CALCIUM 20 MG: 10 TABLET, FILM COATED ORAL at 21:57

## 2025-02-05 RX ADMIN — SODIUM CHLORIDE: 9 INJECTION, SOLUTION INTRAVENOUS at 05:31

## 2025-02-05 RX ADMIN — SODIUM CHLORIDE 1000 ML: 9 INJECTION, SOLUTION INTRAVENOUS at 01:31

## 2025-02-05 RX ADMIN — TAMSULOSIN HYDROCHLORIDE 0.4 MG: 0.4 CAPSULE ORAL at 21:57

## 2025-02-05 RX ADMIN — LEVOTHYROXINE SODIUM 100 MCG: 0.1 TABLET ORAL at 11:13

## 2025-02-05 RX ADMIN — CEFEPIME 2000 MG: 2 INJECTION, POWDER, FOR SOLUTION INTRAVENOUS at 18:17

## 2025-02-05 RX ADMIN — PREGABALIN 150 MG: 75 CAPSULE ORAL at 21:57

## 2025-02-05 RX ADMIN — CEFEPIME 2000 MG: 2 INJECTION, POWDER, FOR SOLUTION INTRAVENOUS at 01:35

## 2025-02-05 RX ADMIN — SODIUM CHLORIDE, PRESERVATIVE FREE 10 ML: 5 INJECTION INTRAVENOUS at 09:52

## 2025-02-05 RX ADMIN — ASPIRIN 81 MG: 81 TABLET, CHEWABLE ORAL at 22:01

## 2025-02-05 RX ADMIN — CEFEPIME 2000 MG: 2 INJECTION, POWDER, FOR SOLUTION INTRAVENOUS at 09:51

## 2025-02-05 RX ADMIN — ENOXAPARIN SODIUM 30 MG: 100 INJECTION SUBCUTANEOUS at 21:57

## 2025-02-05 RX ADMIN — KETOROLAC TROMETHAMINE 15 MG: 15 INJECTION, SOLUTION INTRAMUSCULAR; INTRAVENOUS at 01:40

## 2025-02-05 RX ADMIN — PREGABALIN 150 MG: 75 CAPSULE ORAL at 11:13

## 2025-02-05 RX ADMIN — ENOXAPARIN SODIUM 30 MG: 100 INJECTION SUBCUTANEOUS at 09:47

## 2025-02-05 RX ADMIN — Medication 1000 UNITS: at 21:57

## 2025-02-05 RX ADMIN — TORSEMIDE 15 MG: 20 TABLET ORAL at 21:57

## 2025-02-05 RX ADMIN — VANCOMYCIN 2000 MG: 2 INJECTION, SOLUTION INTRAVENOUS at 02:21

## 2025-02-05 RX ADMIN — INSULIN GLARGINE 30 UNITS: 100 INJECTION, SOLUTION SUBCUTANEOUS at 21:57

## 2025-02-05 RX ADMIN — ACETAMINOPHEN 650 MG: 650 SUPPOSITORY RECTAL at 01:49

## 2025-02-05 RX ADMIN — VANCOMYCIN HYDROCHLORIDE 1000 MG: 1 INJECTION, POWDER, LYOPHILIZED, FOR SOLUTION INTRAVENOUS at 16:53

## 2025-02-05 ASSESSMENT — PAIN SCALES - GENERAL: PAINLEVEL_OUTOF10: 4

## 2025-02-05 ASSESSMENT — PAIN DESCRIPTION - LOCATION: LOCATION: BACK

## 2025-02-05 ASSESSMENT — ENCOUNTER SYMPTOMS
RESPIRATORY NEGATIVE: 1
EYES NEGATIVE: 1
ALLERGIC/IMMUNOLOGIC NEGATIVE: 1
GASTROINTESTINAL NEGATIVE: 1

## 2025-02-05 ASSESSMENT — PAIN DESCRIPTION - ORIENTATION: ORIENTATION: LOWER

## 2025-02-05 ASSESSMENT — PAIN DESCRIPTION - DESCRIPTORS: DESCRIPTORS: ACHING

## 2025-02-05 ASSESSMENT — PAIN - FUNCTIONAL ASSESSMENT: PAIN_FUNCTIONAL_ASSESSMENT: ACTIVITIES ARE NOT PREVENTED

## 2025-02-05 NOTE — PROGRESS NOTES
Routine inpatient EEG completed.  Epileptologist, Dr. Aminah Tobar, notified via [employer].       Electronically signed by Sathya Rodriguez on 2/5/2025 at 12:57 PM

## 2025-02-05 NOTE — PROGRESS NOTES
4 Eyes Skin Assessment     NAME:  Pino West  YOB: 1955  MEDICAL RECORD NUMBER:  [id number]    The patient is being assessed for  Admission    I agree that at least one RN has performed a thorough Head to Toe Skin Assessment on the patient. ALL assessment sites listed below have been assessed.      Areas assessed by both nurses:    Head, Face, Ears, Shoulders, Back, Chest, Arms, Elbows, Hands, Sacrum. Buttock, Coccyx, Ischium, Legs. Feet and Heels, and Under Medical Devices         Does the Patient have a Wound? No noted wound(s)       Wiley Prevention initiated by RN: No  Wound Care Orders initiated by RN: No    Pressure Injury (Stage 3,4, Unstageable, DTI, NWPT, and Complex wounds) if present, place Wound referral order by RN under : No    New Ostomies, if present place, Ostomy referral order under : No     Nurse 1 eSignature: Electronically signed by Aminah España LPN on 2/5/25 at 4:18 AM EST    **SHARE this note so that the co-signing nurse can place an eSignature**    Nurse 2 eSignature: Electronically signed by Svetlana Chinchilla RN on 2/5/25 at 4:28 AM EST

## 2025-02-05 NOTE — PROCEDURES
ROUTINE ELECTROENCEPHALOGRAM    Identifying Information:  Name: Pino West  MRN: [id number]  : 1955  Interpreting Physician: Aminah Tobar DO  Referring Provider: Anna Antony APRN - CNS  Date of EE25  Procedure Location: Inpatient     Clinical History:  Pino West is a 69 y.o. male with concerns for seizure like activity.     Current Medications:    sodium chloride flush  5-40 mL IntraVENous 2 times per day    enoxaparin  30 mg SubCUTAneous BID    insulin lispro  0-8 Units SubCUTAneous 4x Daily AC & HS    cefepime  2,000 mg IntraVENous Q8H    vancomycin  1,000 mg IntraVENous Q12H    aspirin  81 mg Oral Nightly    atorvastatin  20 mg Oral Nightly    Vitamin D  1,000 Units Oral Nightly    [START ON 2025] ferrous sulfate  325 mg Oral Daily with breakfast    insulin glargine  30 Units SubCUTAneous Nightly    levothyroxine  100 mcg Oral Daily    tamsulosin  0.4 mg Oral Nightly    torsemide  15 mg Oral Nightly    sodium chloride flush  5-40 mL IntraVENous 2 times per day    pregabalin  150 mg Oral BID        Indication:  Rule out seizure/seizure disorder     Technical Summary:  28 channels of EEG were recorded in a digital format on a patient who was reported to be awake and asleep during the recording. The patient was not sleep deprived prior to the EEG.     The PDR consisted of well-developed, well-regulated 8-8.5 Hz alpha activity, maximal over the posterior head regions and reactive to eye opening and closure.     Photic stimulation was performed and did not produce any abnormalities. During the recording stage II sleep was seen.     The EKG lead revealed no rhythm abnormalities.       EEG Interpretation:   This EEG was within normal limits for a patient of this age in the awake and asleep states. No focal, lateralizing, or epileptiform features were seen during the recording.       Clinical correlation is recommended.    Aminah Tobar DO  Epileptologist  2025 1:31

## 2025-02-05 NOTE — ED PROVIDER NOTES
[facility]      TRIAGE CHIEF COMPLAINT:   Altered Mental Status and Fall      [place]:  Pino West is a 69 y.o. male that presents with complaint of altered mental status.  Patient was found by wife apparently in the bathroom laying on the ground.  Confused.  Patient upon arrival brought in by EMS he is confused he does not know the year or the month or the president.  He has no complaints he states he hurts everywhere but when asked where his pain is he says he has no pain whatsoever.  He is tachycardic on arrival he is on oxygen vital signs are stable temp of 99.6 no family present.  Workup initiated he is awake and talking but is confused..    REVIEW OF SYSTEMS:      Review of Systems   Unable to perform ROS: Mental status change   Constitutional: Negative.    Eyes: Negative.    Respiratory: Negative.     Cardiovascular: Negative.    Gastrointestinal: Negative.    Endocrine: Negative.    Genitourinary: Negative.    Musculoskeletal: Negative.    Allergic/Immunologic: Negative.    Neurological: Negative.    Hematological: Negative.    Psychiatric/Behavioral:  Positive for confusion.    All other systems reviewed and are negative.      Past Medical History:   Diagnosis Date    Adenomatous polyp of colon     Bulging discs     CABG (coronary artery bypass graft) 2-2001    X 3 vessels    CAD (coronary artery disease) 2-2001    S/P CABG X 3 vessels    Diabetes mellitus (HCC)     diagnosed two weeks ago    H/O cardiovascular stress test 2/13/2015    cardiolite-normal, no ischemia, EF67%    Hyperlipidemia     Hypotestosteronism     Lumbar facet arthropathy     Right and Left L4-5/L5-S1-sees Dr Avilez    Nausea & vomiting     Obstructive sleep apnea on CPAP 5/4/12    CPAP 10cm    Prostate hypertrophy     Proteinuria     Screening colonoscopy 2-    3 tubular adenomas, 3 hyperplastic polyps with recommended follow up in 3 years per Dr Gavin Wagner    Smoking hx      Past Surgical

## 2025-02-05 NOTE — PROGRESS NOTES
PHARMACY VANCOMYCIN MONITORING SERVICE  Pharmacy consulted by Dr. Parmar, for monitoring and adjustment.    Indication for treatment: Vancomycin indication: Sepsis unknown source   Goal trough: Trough Goal: 15-20 mcg/mL  AUC/DREW: 400-600      Pertinent Laboratory Values:   Temp Readings from Last 3 Encounters:   02/05/25 98.6 °F (37 °C) (Oral)   02/05/25 (!) 101.9 °F (38.8 °C) (Rectal)   12/16/11 97 °F (36.1 °C) (Temporal)     Recent Labs     02/04/25  2139 02/05/25  0700   WBC 5.8 4.0     Recent Labs     02/04/25  2139 02/05/25  0700   BUN 20 18   CREATININE 1.2 1.2     Estimated Creatinine Clearance: 72 mL/min (based on SCr of 1.2 mg/dL).    Intake/Output Summary (Last 24 hours) at 2/5/2025 1346  Last data filed at 2/5/2025 0957  Gross per 24 hour   Intake 250 ml   Output --   Net 250 ml     Last Encounter Weight:  Wt Readings from Last 3 Encounters:   02/05/25 118.4 kg (261 lb)   02/04/25 118.4 kg (261 lb)   02/20/19 118.4 kg (261 lb)       Pertinent Cultures:   Date    Source    Results  2/4   Blood    Preliminary :No growth  2/5   Sputum/Respiratory  Not detected  2/5   MRSA Nasal   Pending      Vancomycin level:   TROUGH:  No results for input(s): \"VANCOTROUGH\" in the last 72 hours.  RANDOM:  No results for input(s): \"VANCORANDOM\" in the last 72 hours.    Assessment:  HPI: 69 y.o. male that presents with complaint of altered mental status.  He is tachycardic on arrival, he is on oxygen, vital signs are stable temp of 99.6 which increase to 101.  SCr, BUN, and urine output:  Broad antibiotics started leonard to sepsis.          Scr=1.2           BUN=18  UOP, no data  Day(s) of therapy: 7  Vancomycin concentration:     Plan:  Loading dose of vancomycin administered: 2000. Followed by a scheduled dose of vancomycin 1000 every 12 hours.     Pharmacy will continue to monitor patient and adjust therapy as indicated    VANCOMYCIN CONCENTRATION SCHEDULED FOR 2/6 @1300    Thank you for the consult.  Sathya Ledezma

## 2025-02-05 NOTE — PROGRESS NOTES
Briefly saw the patient at bed side  Detailed H&P will be done by day team.  Patient transferred from [place] ER for altered mental status.  Workup for altered mental status   -CT head-no acute process  -Patient was febrile with temp 99.6, increased to 101  -Lactic acid 4, improved to 2.5 with fluids  -Chest x-ray with no acute process.  -Respiratory viral panel negative  -No leukocytosis  -UA not suggestive of infection  -Random blood glucose was 186.  -VBG within normal range  -Urine drug screen positive for opiates(patient is on Norco)    Patient is currently easily arousable, sick appearing, speech clear, knew he was in the hospital, did not know the year.  Knew his name, date of birth.  Following commands.  No focal deficits noted.  -Continue broad-spectrum antibiotics  -Neurology consult

## 2025-02-05 NOTE — PROGRESS NOTES
RENAL DOSE ADJUSTMENT MADE PER P/T PROTOCOL    PREVIOUS ORDER:  Cefepime 2000 mg q12h    Indication: sepsis of unknown etiology    Estimated Creatinine Clearance: 72 mL/min (based on SCr of 1.2 mg/dL).  Recent Labs     02/04/25 2139   BUN 20   CREATININE 1.2   *   INR 1.1     NEW RENALLY ADJUSTED ORDER:  Cefepime 2000 mg q8h [Extended infusion]    Joni Verde [facility]  2/5/2025 5:12 AM

## 2025-02-05 NOTE — H&P
V2.0  History and Physical      Name:  Pino West /Age/Sex: 1955  (69 y.o. male)   MRN & CSN:  [id number] & [id number] Encounter Date/Time: 2025 10:26 AM EST   Location:  [address] [place] [address] PCP: Hai Yadav MD       Hospital Day: 1    Assessment and Plan:   Pino West is a 69 y.o. male with a pmh of BPH, CAD s/p CABG x 3, DM-2, HLD, Hypotestosteronism, Hypothyroidism, NATHANAEL on CPAP, and Peripheral Neuropathy,  who presents with AMS (altered mental status)    Hospital Problems             Last Modified POA    * (Principal) AMS (altered mental status) 2025 Yes       Fall with AMS with Lactic Acidosis - improved   Admit Inpatient on Telemetry  pt states he fell to the floor without warning, denies lightheadedness, dizziness, chest pain, palpitations or leg weakness. Does have chronic peripheral neuropathy due to diabetes. Denies loss of consciousness, but he did lose his urine right and was shaking before going down and now has fatigue.   CT Head, CT C-spine, CXR and Pelvis Xray are all negative for anything acute, UA looks non infectious, Viral Resp Panel is negative, VBG is WNL, CK elev 479 (will trend daily), Troponin 22>21, UDS + opiates only   Lactate 4.0>2.5, improved after IVF, WBC 4.0, Procal 0.30, afebrile, no cough or dysuria or signs of infection   MRSA Cx: negative, Blood CX x 2: pending, Urine CX: pending   Cont IV Vancomycin and Cefepime while awaiting cultures   Neuro Consult: ordered EEG and MRI Brain to r/o seizures   EEG: wnl   MRI Brain w/ and w/out contrast: pending   PT/OT Evals: pending   ECHO: pending   Mild LFT elevation  ALT 49, , will trend  Hypoxia   NOT on home oxygen, requiring 2 L per nc currently   DM-2 with Peripheral Neuropathy   HOLD oral diabetes meds, cont Lyrica 150 mg bid   POCT Glucose Qac&hs, Med Dose ISS, cont long acting insulin qhs (Lantus 30, home dose is Tresiba 45 u)   Hypertension  Cont torsemide 15 mg   Hyperlipidemia, CAD and hx of CABG  Cont

## 2025-02-05 NOTE — CONSULTS
Neurology Service Consult Note  [facility]   Patient Name: Pino West  : 1955        Subjective:   Reason for consult: AMS, found unresponsive  69 y.o. -male with history of CAD, NATHANAEL, and DM presenting to [facility] after being found unresponsive by wife. She had reported she came home from work and patient had been on the couch. He got up to use the bathroom. Wife states he started shaking and fell to the floor.  He was brought in for further evaluation. He was noted to be tachycardic on arrival. Initial lactic acid was 4.0. . He did have a febrile episode in the ED. He was started on empiric antibiotics and transferred to [facility] for further work up.    Patient seen and examined. He is alert and is aware he is in the hospital. He does not recall much of the events of what brought him to the hospital. He remembers his wife calling EMS. His responses are delayed this morning. He denies headache. He is currently non toxic appearing. Neck is supple and has full range of motion. He is afebrile. No focal or lateralizing findings noted. Discussed need for EEG and MRI.       Past Medical History:   Diagnosis Date    Adenomatous polyp of colon     Bulging discs     CABG (coronary artery bypass graft) -2001    X 3 vessels    CAD (coronary artery disease) 2-    S/P CABG X 3 vessels    Diabetes mellitus (HCC)     diagnosed two weeks ago    H/O cardiovascular stress test 2015    cardiolite-normal, no ischemia, EF67%    Hyperlipidemia     Hypotestosteronism     Lumbar facet arthropathy     Right and Left L4-5/L5-S1-sees Dr Avilez    Nausea & vomiting     Obstructive sleep apnea on CPAP 12    CPAP 10cm    Prostate hypertrophy     Proteinuria     Screening colonoscopy 2008    3 tubular adenomas, 3 hyperplastic polyps with recommended follow up in 3 years per Dr Gavin Wagner    Smoking hx     :   Past Surgical History:   Procedure     Not on file   Tobacco Use    Smoking status: Former     Current packs/day: 0.00     Types: Cigarettes     Quit date: 2001     Years since quittin.1    Smokeless tobacco: Never    Tobacco comments:     30-pack year history of cigarette smoking   Substance and Sexual Activity    Alcohol use: Yes     Alcohol/week: 2.0 standard drinks of alcohol     Types: 2 Cans of beer per week     Comment: weekly    Drug use: No    Sexual activity: Not on file   Other Topics Concern    Not on file   Social History Narrative    Not on file     Social Determinants of Health     Financial Resource Strain: Not on file   Food Insecurity: No Food Insecurity (2025)    Hunger Vital Sign     Worried About Running Out of Food in the Last Year: Never true     Ran Out of Food in the Last Year: Never true   Transportation Needs: No Transportation Needs (2025)    PRAPARE - Transportation     Lack of Transportation (Medical): No     Lack of Transportation (Non-Medical): No   Physical Activity: Not on file   Stress: Not on file   Social Connections: Not on file   Intimate Partner Violence: Not on file   Housing Stability: Low Risk  (2025)    Housing Stability Vital Sign     Unable to Pay for Housing in the Last Year: No     Number of Times Moved in the Last Year: 0     Homeless in the Last Year: No      Family History   Problem Relation Age of Onset    Coronary Art Dis Father     COPD Father     Heart Failure Father         d. age 70s of CHF    Coronary Art Dis Brother         S/P CABG         ROS (10 systems)  In addition to that documented in the HPI above, the additional ROS was obtained:  Constitutional: Denies fevers or chills  Eyes: Denies vision changes  ENMT: Denies sore throat  CV: Denies chest pain  Resp: Denies SOB  GI: Denies vomiting or diarrhea  : Denies painful urination  MSK: Denies recent trauma  Skin: Denies new rashes  Neuro: Denies new numbness or tingling or weakness  Endocrine: Denies unexpected weight

## 2025-02-06 ENCOUNTER — APPOINTMENT (OUTPATIENT)
Dept: NON INVASIVE DIAGNOSTICS | Age: 70
DRG: 947 | End: 2025-02-06
Attending: INTERNAL MEDICINE
Payer: OTHER GOVERNMENT

## 2025-02-06 LAB
ALBUMIN SERPL-MCNC: 3.5 G/DL (ref 3.4–5)
ALBUMIN/GLOB SERPL: 1.3 {RATIO} (ref 1.1–2.2)
ALP SERPL-CCNC: 75 U/L (ref 40–129)
ALT SERPL-CCNC: 63 U/L (ref 10–40)
AMMONIA PLAS-SCNC: 36 UMOL/L (ref 16–60)
ANION GAP SERPL CALCULATED.3IONS-SCNC: 10 MMOL/L (ref 9–17)
ARTERIAL PATENCY WRIST A: YES
AST SERPL-CCNC: 168 U/L (ref 15–37)
BASOPHILS # BLD: 0.03 K/UL
BASOPHILS NFR BLD: 1 % (ref 0–1)
BILIRUB SERPL-MCNC: 0.5 MG/DL (ref 0–1)
BODY TEMPERATURE: 37
BUN SERPL-MCNC: 15 MG/DL (ref 7–20)
CALCIUM SERPL-MCNC: 8.6 MG/DL (ref 8.3–10.6)
CHLORIDE SERPL-SCNC: 107 MMOL/L (ref 99–110)
CK SERPL-CCNC: 2473 U/L (ref 26–192)
CO2 SERPL-SCNC: 23 MMOL/L (ref 21–32)
COHGB MFR BLD: 0.5 % (ref 0.5–1.5)
CREAT SERPL-MCNC: 1.2 MG/DL (ref 0.8–1.3)
ECHO AO ROOT DIAM: 3.3 CM
ECHO AO ROOT INDEX: 1.44 CM/M2
ECHO AV AREA PEAK VELOCITY: 1.9 CM2
ECHO AV AREA VTI: 1.8 CM2
ECHO AV AREA/BSA PEAK VELOCITY: 0.8 CM2/M2
ECHO AV AREA/BSA VTI: 0.8 CM2/M2
ECHO AV MEAN GRADIENT: 5 MMHG
ECHO AV MEAN VELOCITY: 1.1 M/S
ECHO AV PEAK GRADIENT: 6 MMHG
ECHO AV PEAK VELOCITY: 1.2 M/S
ECHO AV VELOCITY RATIO: 0.75
ECHO AV VTI: 23.9 CM
ECHO BSA: 2.38 M2
ECHO EST RA PRESSURE: 3 MMHG
ECHO LA AREA 4C: 18.7 CM2
ECHO LA DIAMETER INDEX: 1.4 CM/M2
ECHO LA DIAMETER: 3.2 CM
ECHO LA MAJOR AXIS: 6.1 CM
ECHO LA TO AORTIC ROOT RATIO: 0.97
ECHO LA VOL MOD A4C: 46 ML (ref 18–58)
ECHO LA VOLUME INDEX MOD A4C: 20 ML/M2 (ref 16–34)
ECHO LV E' LATERAL VELOCITY: 8.6 CM/S
ECHO LV E' SEPTAL VELOCITY: 8.2 CM/S
ECHO LV EDV A4C: 110 ML
ECHO LV EDV INDEX A4C: 48 ML/M2
ECHO LV EF PHYSICIAN: 55 %
ECHO LV EJECTION FRACTION A4C: 60 %
ECHO LV ESV A4C: 44 ML
ECHO LV ESV INDEX A4C: 19 ML/M2
ECHO LV FRACTIONAL SHORTENING: 45 % (ref 28–44)
ECHO LV INTERNAL DIMENSION DIASTOLE INDEX: 1.83 CM/M2
ECHO LV INTERNAL DIMENSION DIASTOLIC: 4.2 CM (ref 4.2–5.9)
ECHO LV INTERNAL DIMENSION SYSTOLIC INDEX: 1 CM/M2
ECHO LV INTERNAL DIMENSION SYSTOLIC: 2.3 CM
ECHO LV IVSD: 1.1 CM (ref 0.6–1)
ECHO LV MASS 2D: 147 G (ref 88–224)
ECHO LV MASS INDEX 2D: 64.2 G/M2 (ref 49–115)
ECHO LV POSTERIOR WALL DIASTOLIC: 1 CM (ref 0.6–1)
ECHO LV RELATIVE WALL THICKNESS RATIO: 0.48
ECHO LVOT AREA: 2.5 CM2
ECHO LVOT AV VTI INDEX: 0.72
ECHO LVOT DIAM: 1.8 CM
ECHO LVOT MEAN GRADIENT: 2 MMHG
ECHO LVOT PEAK GRADIENT: 3 MMHG
ECHO LVOT PEAK VELOCITY: 0.9 M/S
ECHO LVOT STROKE VOLUME INDEX: 19.2 ML/M2
ECHO LVOT SV: 44 ML
ECHO LVOT VTI: 17.3 CM
ECHO MV A VELOCITY: 0.9 M/S
ECHO MV E VELOCITY: 0.82 M/S
ECHO MV E/A RATIO: 0.91
ECHO MV E/E' LATERAL: 9.53
ECHO MV E/E' RATIO (AVERAGED): 9.77
ECHO MV E/E' SEPTAL: 10
ECHO RIGHT VENTRICULAR SYSTOLIC PRESSURE (RVSP): 26 MMHG
ECHO RV MID DIMENSION: 3.1 CM
ECHO TV REGURGITANT MAX VELOCITY: 2.42 M/S
ECHO TV REGURGITANT PEAK GRADIENT: 23 MMHG
EOSINOPHIL # BLD: 0.12 K/UL
EOSINOPHILS RELATIVE PERCENT: 3 % (ref 0–3)
ERYTHROCYTE [DISTWIDTH] IN BLOOD BY AUTOMATED COUNT: 14.4 % (ref 11.7–14.9)
FOLATE SERPL-MCNC: 27.1 NG/ML (ref 4.8–24.2)
GFR, ESTIMATED: 59 ML/MIN/1.73M2
GLUCOSE BLD-MCNC: 117 MG/DL (ref 74–99)
GLUCOSE BLD-MCNC: 123 MG/DL (ref 74–99)
GLUCOSE BLD-MCNC: 132 MG/DL (ref 74–99)
GLUCOSE BLD-MCNC: 138 MG/DL (ref 74–99)
GLUCOSE SERPL-MCNC: 103 MG/DL (ref 74–99)
HCO3 VENOUS: 25.2 MMOL/L (ref 22–29)
HCT VFR BLD AUTO: 36.6 % (ref 42–52)
HGB BLD-MCNC: 11.9 G/DL (ref 13.5–18)
IMM GRANULOCYTES # BLD AUTO: 0.02 K/UL
IMM GRANULOCYTES NFR BLD: 0 %
LACTATE BLDV-SCNC: 0.8 MMOL/L (ref 0.4–2)
LYMPHOCYTES NFR BLD: 0.97 K/UL
LYMPHOCYTES RELATIVE PERCENT: 21 % (ref 24–44)
MCH RBC QN AUTO: 29.5 PG (ref 27–31)
MCHC RBC AUTO-ENTMCNC: 32.5 G/DL (ref 32–36)
MCV RBC AUTO: 90.8 FL (ref 78–100)
METHEMOGLOBIN: 0.3 % (ref 0.5–1.5)
MICROORGANISM SPEC CULT: NORMAL
MONOCYTES NFR BLD: 0.54 K/UL
MONOCYTES NFR BLD: 12 % (ref 0–4)
NEGATIVE BASE EXCESS, VEN: 0.2 MMOL/L (ref 0–3)
NEUTROPHILS NFR BLD: 64 % (ref 36–66)
NEUTS SEG NFR BLD: 2.96 K/UL
OXYHGB MFR BLD: 74.4 %
PCO2 VENOUS: 44 MM HG (ref 38–54)
PH VENOUS: 7.38 (ref 7.32–7.43)
PLATELET # BLD AUTO: 103 K/UL (ref 140–440)
PMV BLD AUTO: 10.2 FL (ref 7.5–11.1)
PO2 VENOUS: 41.4 MM HG (ref 23–48)
POTASSIUM SERPL-SCNC: 4.3 MMOL/L (ref 3.5–5.1)
PROT SERPL-MCNC: 6.3 G/DL (ref 6.4–8.2)
RBC # BLD AUTO: 4.03 M/UL (ref 4.6–6.2)
SERVICE CMNT-IMP: NORMAL
SODIUM SERPL-SCNC: 141 MMOL/L (ref 136–145)
SPECIMEN DESCRIPTION: NORMAL
T4 FREE SERPL-MCNC: 0.8 NG/DL (ref 0.9–1.8)
VIT B12 SERPL-MCNC: 873 PG/ML (ref 211–911)
WBC OTHER # BLD: 4.6 K/UL (ref 4–10.5)

## 2025-02-06 PROCEDURE — 93306 TTE W/DOPPLER COMPLETE: CPT

## 2025-02-06 PROCEDURE — 82746 ASSAY OF FOLIC ACID SERUM: CPT

## 2025-02-06 PROCEDURE — 93306 TTE W/DOPPLER COMPLETE: CPT | Performed by: INTERNAL MEDICINE

## 2025-02-06 PROCEDURE — 97166 OT EVAL MOD COMPLEX 45 MIN: CPT

## 2025-02-06 PROCEDURE — 6370000000 HC RX 637 (ALT 250 FOR IP): Performed by: INTERNAL MEDICINE

## 2025-02-06 PROCEDURE — 94761 N-INVAS EAR/PLS OXIMETRY MLT: CPT

## 2025-02-06 PROCEDURE — 6360000002 HC RX W HCPCS: Performed by: NURSE PRACTITIONER

## 2025-02-06 PROCEDURE — 2700000000 HC OXYGEN THERAPY PER DAY

## 2025-02-06 PROCEDURE — 84439 ASSAY OF FREE THYROXINE: CPT

## 2025-02-06 PROCEDURE — 83605 ASSAY OF LACTIC ACID: CPT

## 2025-02-06 PROCEDURE — 36415 COLL VENOUS BLD VENIPUNCTURE: CPT

## 2025-02-06 PROCEDURE — 97530 THERAPEUTIC ACTIVITIES: CPT

## 2025-02-06 PROCEDURE — 85025 COMPLETE CBC W/AUTO DIFF WBC: CPT

## 2025-02-06 PROCEDURE — 2500000003 HC RX 250 WO HCPCS: Performed by: NURSE PRACTITIONER

## 2025-02-06 PROCEDURE — 1200000000 HC SEMI PRIVATE

## 2025-02-06 PROCEDURE — 2500000003 HC RX 250 WO HCPCS: Performed by: INTERNAL MEDICINE

## 2025-02-06 PROCEDURE — 99232 SBSQ HOSP IP/OBS MODERATE 35: CPT | Performed by: CLINICAL NURSE SPECIALIST

## 2025-02-06 PROCEDURE — 80053 COMPREHEN METABOLIC PANEL: CPT

## 2025-02-06 PROCEDURE — 82607 VITAMIN B-12: CPT

## 2025-02-06 PROCEDURE — 6370000000 HC RX 637 (ALT 250 FOR IP): Performed by: NURSE PRACTITIONER

## 2025-02-06 PROCEDURE — 82805 BLOOD GASES W/O2 SATURATION: CPT

## 2025-02-06 PROCEDURE — 82550 ASSAY OF CK (CPK): CPT

## 2025-02-06 PROCEDURE — 82962 GLUCOSE BLOOD TEST: CPT

## 2025-02-06 PROCEDURE — 82140 ASSAY OF AMMONIA: CPT

## 2025-02-06 PROCEDURE — 6360000002 HC RX W HCPCS: Performed by: INTERNAL MEDICINE

## 2025-02-06 PROCEDURE — 2580000003 HC RX 258: Performed by: INTERNAL MEDICINE

## 2025-02-06 RX ORDER — PROCHLORPERAZINE EDISYLATE 5 MG/ML
10 INJECTION INTRAMUSCULAR; INTRAVENOUS ONCE
Status: COMPLETED | OUTPATIENT
Start: 2025-02-06 | End: 2025-02-06

## 2025-02-06 RX ADMIN — ASPIRIN 81 MG: 81 TABLET, CHEWABLE ORAL at 21:52

## 2025-02-06 RX ADMIN — ENOXAPARIN SODIUM 30 MG: 100 INJECTION SUBCUTANEOUS at 21:52

## 2025-02-06 RX ADMIN — SODIUM CHLORIDE, PRESERVATIVE FREE 10 ML: 5 INJECTION INTRAVENOUS at 21:54

## 2025-02-06 RX ADMIN — SODIUM CHLORIDE, PRESERVATIVE FREE 10 ML: 5 INJECTION INTRAVENOUS at 09:33

## 2025-02-06 RX ADMIN — ENOXAPARIN SODIUM 30 MG: 100 INJECTION SUBCUTANEOUS at 10:11

## 2025-02-06 RX ADMIN — HYDROCODONE BITARTRATE AND ACETAMINOPHEN 1 TABLET: 5; 325 TABLET ORAL at 23:35

## 2025-02-06 RX ADMIN — PREGABALIN 150 MG: 75 CAPSULE ORAL at 21:52

## 2025-02-06 RX ADMIN — PROCHLORPERAZINE EDISYLATE 10 MG: 5 INJECTION INTRAMUSCULAR; INTRAVENOUS at 01:33

## 2025-02-06 RX ADMIN — CEFEPIME 2000 MG: 2 INJECTION, POWDER, FOR SOLUTION INTRAVENOUS at 14:59

## 2025-02-06 RX ADMIN — TAMSULOSIN HYDROCHLORIDE 0.4 MG: 0.4 CAPSULE ORAL at 21:52

## 2025-02-06 RX ADMIN — Medication 1000 UNITS: at 21:52

## 2025-02-06 RX ADMIN — SODIUM CHLORIDE, PRESERVATIVE FREE 10 ML: 5 INJECTION INTRAVENOUS at 21:53

## 2025-02-06 RX ADMIN — PREGABALIN 150 MG: 75 CAPSULE ORAL at 10:11

## 2025-02-06 RX ADMIN — CEFEPIME 2000 MG: 2 INJECTION, POWDER, FOR SOLUTION INTRAVENOUS at 01:41

## 2025-02-06 RX ADMIN — VANCOMYCIN HYDROCHLORIDE 1000 MG: 1 INJECTION, POWDER, LYOPHILIZED, FOR SOLUTION INTRAVENOUS at 09:31

## 2025-02-06 RX ADMIN — LEVOTHYROXINE SODIUM 100 MCG: 0.1 TABLET ORAL at 06:08

## 2025-02-06 RX ADMIN — TORSEMIDE 15 MG: 20 TABLET ORAL at 21:52

## 2025-02-06 ASSESSMENT — PAIN DESCRIPTION - LOCATION: LOCATION: HAND

## 2025-02-06 ASSESSMENT — PAIN SCALES - GENERAL: PAINLEVEL_OUTOF10: 7

## 2025-02-06 ASSESSMENT — PAIN DESCRIPTION - ORIENTATION: ORIENTATION: RIGHT;LEFT

## 2025-02-06 ASSESSMENT — PAIN DESCRIPTION - DESCRIPTORS: DESCRIPTORS: ACHING

## 2025-02-06 NOTE — PROGRESS NOTES
V2.0  [facility] Hospitalist Progress Note      Name:  Pino West /Age/Sex: 1955  (69 y.o. male)   MRN & CSN:  [id number] & [id number] Encounter Date/Time: 2025 6:15 PM EST    Location:  [address] [place] [address]-A PCP: Hai Yadav MD       Hospital Day: 2    Assessment and Plan:   Pino West is a 69 y.o. male with pmh of BPH, CAD s/p CABG x 3, DM-2, HLD, Hypotestosteronism, Hypothyroidism, NATHANAEL on CPAP, and Peripheral Neuropathy, who presents with AMS (altered mental status) pending PT OT and possible SNF show more remotely, urgency, or neurogenic manner.  Wound care.  Gabrielle became gingiva for sugar gradually whole, the whole 32: Reviewed: 11 over:    Plan:  Nontraumatic rhabdomyolysis  -Was not found in bathroom ground by his wife, confused  -CK is increased to 2473  -, ALT 63  -Slightly worse than yesterday  -Continue gentle hydration  -Trending CK and CMP    Acute encephalopathy (resolved)  -Chest x-ray no indication for pneumonia  -UA is negative  -White cell is normal  -No indication for infection  -DC antibiotics  -MRI of brain negative for acute stroke  -EEG unremarkable  -Appreciate neurology input, follow-up outpatient for ambulatory EEG, sign off    Hypoxia  -On 2 L NC in admission  -Possible due to NATHANAEL because patient was sleepy  -Keep monitor    DM-2 with Peripheral Neuropathy   -HOLD oral diabetes meds, cont Lyrica 150 mg bid   -POCT Glucose Qac&hs, Med Dose ISS, cont long acting insulin qhs (Lantus 30, home dose is Tresiba 45 u)     Hypertension  -Cont torsemide 15 mg     Hyperlipidemia, CAD and hx of CABG  -Cont Lipitor 20 mg and ASA 81 mg     Hypothyroidism  -Cont levothyroxine 100 mcg   -Elev TSH 4.32, add Free T4 is slightly diminished at 0.8  -Repeat T4 free in a.m.    NATHANAEL no longer on CPap   -BPH  -Cont Flomax     Vit D Deficiency   -Cont Vit D 1000 u qd     Diet ADULT DIET; Regular; 4 carb choices (60 gm/meal); No Added Salt (3-4 gm)   DVT Prophylaxis [] Lovenox, []  Heparin, []  Sacroiliac joints are grossly symmetric. Visualized arcuate lines are intact. Pubic symphysis is intact. The iliopectineal and ilioischial lines are intact. Femoral heads are seated within their respective acetabula with mild joint space  loss. No acute fracture. IMPRESSION:  No acute findings. In elderly patients, radiographs alone have been shown to have limited sensitivity in detection of hip fractures. Multiple studies have confirmed that MRI sensitivity approaches 100% in cases of occult hip fractures. Head-to-head studies show MRI outperforming CT for detection of radiographically occult hip fractures.  Therefore, if there are continued symptoms, the patient is unable to  bear weight, or the clinical suspicion persists, an MRI is recommended, and can  be specifically tailored to exclude fracture using sequences totalling less than 10 minutes. [Reference: Roth RJ, Gerard BN, Kransdorf MJ, et. al. ACR Appropriateness Criteria: Acute Hip Pain?Suspected Fracture. 2013.]  Dictated and Electronically Signed By: Shilo Luciano MD 2/4/2025 22:03        XR CHEST PORTABLE    Result Date: 2/4/2025  PROCEDURE: XR CHEST PORTABLE DATE OF EXAM:  2/4/2025 22:54 DEMOGRAPHICS: 69 years old Male INDICATION: fall/ams COMPARISON: None FINDINGS:  Median sternotomy wires. Diminished inflation. No pleural effusion, pneumothorax, or focal consolidation. Cardiac silhouette is upper limits of normal. No acute osseous abnormality. IMPRESSION:  No focal consolidation or overt edema.  Dictated and Electronically Signed By: Shilo Luciano MD 2/4/2025 22:00          Electronically signed by MADI Kerr CNP on 2/6/2025 at 7:13 PM

## 2025-02-06 NOTE — PROGRESS NOTES
Occupational Therapy  [facility] ACUTE CARE OCCUPATIONAL THERAPY EVALUATION  Pino West, 1955, 4124/4124-A, 2/6/2025    Discharge Recommendation: Facility for moderate post-acute rehabilitation, anticipate 1-2 hours per day and 5 days per week.    History  [place]:  The encounter diagnosis was Syncope, unspecified syncope type.  Patient  has a past medical history of Adenomatous polyp of colon, Bulging discs, CABG (coronary artery bypass graft), CAD (coronary artery disease), Diabetes mellitus (HCC), H/O cardiovascular stress test, Hyperlipidemia, Hypotestosteronism, Lumbar facet arthropathy, Nausea & vomiting, Obstructive sleep apnea on CPAP, Prostate hypertrophy, Proteinuria, Screening colonoscopy, and Smoking hx.  Patient  has a past surgical history that includes Coronary artery bypass graft (2-2001); Cholecystectomy; Umbilical hernia repair; Anterior cruciate ligament repair; hernia repair; Tonsillectomy; Cardiac surgery; Pilonidal cyst excision; and Colonoscopy.    Subjective:  Patient comments: \"Well, I don't know\".    Pain:  Denied.    Communication with other providers: Nurse brenda moya, S/OT Jas.  Restrictions: General Precautions, Fall Risk     Home Setup/Prior level of function  Social/Functional History  Lives With: Spouse  Type of Home: House  Home Layout: One level  Home Access: Level entry  Bathroom Shower/Tub: Walk-in shower  Bathroom Equipment: Grab bars in shower, Shower chair  Bathroom Accessibility: Accessible  Home Equipment: Cane, Walker - Standard  Has the patient had two or more falls in the past year or any fall with injury in the past year?: Yes  Receives Help From: Family  Prior Level of Assist for ADLs: Independent  Prior Level of Assist for Homemaking: Independent  Homemaking Responsibilities: Yes  Prior Level of Assist for Ambulation: Independent household ambulator, with or without device  Prior Level of Assist for Transfers: Independent  Active :  No  Occupation: Retired    Examination of body systems (includes body structures/functions, activity/participation limitations):  Observation:  Semi-fowlers in bed upon arrival, agreeable to therapy  Vision:  WFL  Hearing: WFL  Cardiopulmonary:  On 2L O2, tele, vitals remained stable throughout session    Body Systems and functions:  ROM R/L:  WFL    Strength R/L:  BUE 5/5,   5/5  Sensation: WFL  Tone: Normal  Coordination: WFL  Perception: WFL    Cognitive and Psychosocial Functioning:  Overall cognitive status:  Pt is A&Ox3, attention appears intact, and is able to follow multi-step commands w/ repetition. Pt required increased time for processing and demo'ed impaired safety awareness.   Affect: Pleasant        Activities of Daily Living (ADLs):  Feeding: Set Up  Grooming: SBA   UB bathing: SBA   LB bathing: Juliana   UB dressing: SBA   LB dressing: maxA (pt required assist to don socks at EOB)  Toileting: modA     Pt ADL function inferred from observation of gross motor function, and assessment of mobility, balance, posture, safety awareness, cognition, and activity tolerance.     AM-PAC 6 click short form for inpatient daily activity:   How much help from another person does the patient currently need... Unable  Dep A Lot  Max A A Lot   Mod A A Little  Min A A Little   CGA  SBA None   Mod I  Indep  Sup   1.  Putting on and taking off regular lower body clothing? [] 1    [x] 2   [] 2   [] 3   [] 3   [] 4      2. Bathing (including washing, rinsing, drying)? [] 1   [] 2   [] 2 [x] 3 [] 3 [] 4   3. Toileting, which includes using toilet, bedpan, or urinal? [] 1    [] 2   [x] 2   [] 3   [] 3   [] 4     4. Putting on and taking off regular upper body clothing? [] 1   [] 2   [] 2   [] 3   [x] 3    [] 4      5. Taking care of personal grooming such as brushing teeth? [] 1   [] 2    [] 2 [] 3    [x] 3   [] 4      6. Eating meals?   [] 1   [] 2   [] 2   [] 3   [] 3   [x] 4      Raw Score:  17    [24=0% impaired(CH),

## 2025-02-06 NOTE — PLAN OF CARE
Problem: Chronic Conditions and Co-morbidities  Goal: Patient's chronic conditions and co-morbidity symptoms are monitored and maintained or improved  2/5/2025 2243 by Aminah España LPN  Outcome: Progressing  2/5/2025 1102 by Stephanie Ruby LPN  Outcome: Progressing     Problem: Discharge Planning  Goal: Discharge to home or other facility with appropriate resources  2/5/2025 2243 by Aminah España LPN  Outcome: Progressing  2/5/2025 1102 by Stephanie Ruby LPN  Outcome: Progressing     Problem: Safety - Adult  Goal: Free from fall injury  2/5/2025 2243 by Aminah España LPN  Outcome: Progressing  2/5/2025 1102 by Stephanie Ruby LPN  Outcome: Progressing     Problem: Pain  Goal: Verbalizes/displays adequate comfort level or baseline comfort level  Outcome: Progressing

## 2025-02-06 NOTE — PROGRESS NOTES
PHARMACY VANCOMYCIN MONITORING SERVICE  Pharmacy consulted by Dr. Ghulam MD for monitoring and adjustment.    Indication for treatment: Vancomycin indication: Sepsis unknown source   Goal trough: Trough Goal: 15-20 mcg/mL  AUC/DREW: 400-600      Pertinent Laboratory Values:   Temp Readings from Last 3 Encounters:   02/06/25 98.5 °F (36.9 °C) (Oral)   02/05/25 (!) 101.9 °F (38.8 °C) (Rectal)   12/16/11 97 °F (36.1 °C) (Temporal)     Recent Labs     02/04/25 2139 02/05/25  0700 02/06/25  0322   WBC 5.8 4.0 4.6     Recent Labs     02/04/25  2139 02/05/25  0700 02/06/25  0322   BUN 20 18 15   CREATININE 1.2 1.2 1.2     Estimated Creatinine Clearance: 72 mL/min (based on SCr of 1.2 mg/dL).    Intake/Output Summary (Last 24 hours) at 2/6/2025 1426  Last data filed at 2/6/2025 1423  Gross per 24 hour   Intake 1738 ml   Output 800 ml   Net 938 ml     Last Encounter Weight:  Wt Readings from Last 3 Encounters:   02/05/25 118.4 kg (261 lb)   02/04/25 118.4 kg (261 lb)   02/20/19 118.4 kg (261 lb)       Pertinent Cultures:   Date    Source    Results  2/4   Blood    No growth  2/5   Sputum/Respiratory  Not detected  2/5   MRSA Nasal   Negative       Vancomycin level:   TROUGH:  No results for input(s): \"VANCOTROUGH\" in the last 72 hours.  RANDOM:  No results for input(s): \"VANCORANDOM\" in the last 72 hours.    Assessment:  HPI: 69 y.o. male that presents with complaint of altered mental status.  He is tachycardic on arrival, he is on oxygen, vital signs are stable temp of 99.6 which increase to 101. Broad antibiotics started due to sepsis.  SCr, BUN, and urine output: stable    Scr=1.2  UOP low=0.3 ml/kg/hr   Pt is afebrile and WBC is normal   Concurrently on Cefepime   Day(s) of therapy: 3 of 7  Vancomycin concentration:   Unable to obtain labs today, lab stated unable to due to next dose due, will get a level tomorrow    Plan:  Loading dose of vancomycin administered: 2000 mg. Followed by a scheduled dose of

## 2025-02-06 NOTE — CARE COORDINATION
Chart reviewed and met w/ patient to initiate discharge planning. CM introduced self and explained role. Patient is from home w/ his wife, [facility] Patient reports he uses a walker and a cane at home PTA. Patient declined any active [facility] PTA. CM discussed possible need for continued therapy at SNF. Patient unsure if he would want SNF at discharge. Patient is able to tell this CM that he is here for a fall but does not recall before the fall. CM asked permission to call his wife and discuss discharge plan. Patient voiced agreement.        02/06/25 5362   Service Assessment   Cognition Alert   History Provided By Patient   Support Systems Spouse/Significant Other   Patient's Healthcare Decision Maker is: Legal Next of Kin   PCP Verified by CM Yes   Can patient return to prior living arrangement Unknown at present   Ability to make needs known: Fair   Financial Resources  ([facility])   Community Resources None   Social/Functional History   Lives With Spouse   Type of Home House   Bathroom Equipment Shower chair   Home Equipment Cane;Walker - Standard   Discharge Planning   Type of Residence House   Living Arrangements Spouse/Significant Other   Current Services Prior To Admission None   Potential Assistance Needed Skilled Nursing Facility;Home Care   DME Ordered? No   Potential Assistance Purchasing Medications No   Type of Home Care Services None   Patient expects to be discharged to: Unknown   Condition of Participation: Discharge Planning   The Patient and/or Patient Representative was provided with a Choice of Provider? Patient   The Patient and/Or Patient Representative agree with the Discharge Plan? Yes

## 2025-02-06 NOTE — PROGRESS NOTES
Neurology Progress Note  [facility]  Patient Name: Pino West     : 1955      Subjective:   C C: AMS  The patient was seen and examined. Chart reviewed in detail. Patient states he is feeling better. CK trending up to 2473. He is alert and oriented this morning. Echo is being completed. Discussed MRI and EEG results and plan for follow up in our office. Stated understanding.     Objective:   Scheduled Meds:   sodium chloride flush  5-40 mL IntraVENous 2 times per day    enoxaparin  30 mg SubCUTAneous BID    insulin lispro  0-8 Units SubCUTAneous 4x Daily AC & HS    cefepime  2,000 mg IntraVENous Q8H    vancomycin  1,000 mg IntraVENous Q12H    aspirin  81 mg Oral Nightly    atorvastatin  20 mg Oral Nightly    Vitamin D  1,000 Units Oral Nightly    ferrous sulfate  325 mg Oral Daily with breakfast    insulin glargine  30 Units SubCUTAneous Nightly    levothyroxine  100 mcg Oral Daily    tamsulosin  0.4 mg Oral Nightly    torsemide  15 mg Oral Nightly    sodium chloride flush  5-40 mL IntraVENous 2 times per day    pregabalin  150 mg Oral BID     Continuous Infusions:   sodium chloride 100 mL/hr at 25 0531    dextrose      dextrose      sodium chloride       PRN Meds:.sodium chloride flush, HYDROcodone-acetaminophen, dextrose, glucose, dextrose bolus **OR** dextrose bolus, glucagon (rDNA), dextrose, sodium chloride flush, sodium chloride, potassium chloride **OR** potassium alternative oral replacement **OR** potassium chloride, magnesium sulfate, ondansetron **OR** ondansetron, polyethylene glycol, acetaminophen **OR** acetaminophen, bisacodyl    Vital Signs:  Vitals:    25 1417 25 2035 25 0403 25 0745   BP: 137/67 137/66 (!) 143/67 138/63   Pulse: 83 94 86 83   Resp: 18 18 20 20   Temp: 98.2 °F (36.8 °C) 99.1 °F (37.3 °C) 99 °F (37.2 °C) 98.6 °F (37 °C)   TempSrc: Oral Oral Oral Oral   SpO2: 98% 95% 91%    Weight:       Height:             General:

## 2025-02-06 NOTE — PLAN OF CARE
Problem: Chronic Conditions and Co-morbidities  Goal: Patient's chronic conditions and co-morbidity symptoms are monitored and maintained or improved  2/5/2025 2244 by Aminah España LPN  Outcome: Progressing  2/5/2025 2243 by Aminah España LPN  Outcome: Progressing  2/5/2025 1102 by Stephanie Ruby LPN  Outcome: Progressing     Problem: Discharge Planning  Goal: Discharge to home or other facility with appropriate resources  2/5/2025 2244 by Aminah España LPN  Outcome: Progressing  2/5/2025 2243 by Aminah España LPN  Outcome: Progressing  2/5/2025 1102 by Stephanie Ruby LPN  Outcome: Progressing     Problem: Safety - Adult  Goal: Free from fall injury  2/5/2025 2244 by Aminah España LPN  Outcome: Progressing  2/5/2025 2243 by Aminah España LPN  Outcome: Progressing  2/5/2025 1102 by Stephanie Ruby LPN  Outcome: Progressing     Problem: Pain  Goal: Verbalizes/displays adequate comfort level or baseline comfort level  2/5/2025 2244 by Aminah España LPN  Outcome: Progressing  2/5/2025 2243 by Aminah España LPN  Outcome: Progressing

## 2025-02-07 LAB
ALBUMIN SERPL-MCNC: 3.6 G/DL (ref 3.4–5)
ALBUMIN/GLOB SERPL: 1.3 {RATIO} (ref 1.1–2.2)
ALP SERPL-CCNC: 75 U/L (ref 40–129)
ALT SERPL-CCNC: 66 U/L (ref 10–40)
ANION GAP SERPL CALCULATED.3IONS-SCNC: 11 MMOL/L (ref 9–17)
AST SERPL-CCNC: 148 U/L (ref 15–37)
BASOPHILS # BLD: 0.02 K/UL
BASOPHILS NFR BLD: 1 % (ref 0–1)
BILIRUB SERPL-MCNC: 0.5 MG/DL (ref 0–1)
BUN SERPL-MCNC: 19 MG/DL (ref 7–20)
CALCIUM SERPL-MCNC: 8.5 MG/DL (ref 8.3–10.6)
CHLORIDE SERPL-SCNC: 102 MMOL/L (ref 99–110)
CK SERPL-CCNC: 1606 U/L (ref 26–192)
CO2 SERPL-SCNC: 26 MMOL/L (ref 21–32)
CREAT SERPL-MCNC: 1.3 MG/DL (ref 0.8–1.3)
EOSINOPHIL # BLD: 0.34 K/UL
EOSINOPHILS RELATIVE PERCENT: 8 % (ref 0–3)
ERYTHROCYTE [DISTWIDTH] IN BLOOD BY AUTOMATED COUNT: 14.4 % (ref 11.7–14.9)
GFR, ESTIMATED: 54 ML/MIN/1.73M2
GLUCOSE BLD-MCNC: 127 MG/DL (ref 74–99)
GLUCOSE BLD-MCNC: 129 MG/DL (ref 74–99)
GLUCOSE BLD-MCNC: 148 MG/DL (ref 74–99)
GLUCOSE BLD-MCNC: 165 MG/DL (ref 74–99)
GLUCOSE SERPL-MCNC: 137 MG/DL (ref 74–99)
HCT VFR BLD AUTO: 35.9 % (ref 42–52)
HGB BLD-MCNC: 11.7 G/DL (ref 13.5–18)
IMM GRANULOCYTES # BLD AUTO: 0.01 K/UL
IMM GRANULOCYTES NFR BLD: 0 %
LYMPHOCYTES NFR BLD: 1.14 K/UL
LYMPHOCYTES RELATIVE PERCENT: 26 % (ref 24–44)
MCH RBC QN AUTO: 29.7 PG (ref 27–31)
MCHC RBC AUTO-ENTMCNC: 32.6 G/DL (ref 32–36)
MCV RBC AUTO: 91.1 FL (ref 78–100)
MONOCYTES NFR BLD: 0.44 K/UL
MONOCYTES NFR BLD: 10 % (ref 0–4)
NEUTROPHILS NFR BLD: 55 % (ref 36–66)
NEUTS SEG NFR BLD: 2.41 K/UL
PLATELET # BLD AUTO: 111 K/UL (ref 140–440)
PMV BLD AUTO: 10.1 FL (ref 7.5–11.1)
POTASSIUM SERPL-SCNC: 3.6 MMOL/L (ref 3.5–5.1)
PROT SERPL-MCNC: 6.3 G/DL (ref 6.4–8.2)
RBC # BLD AUTO: 3.94 M/UL (ref 4.6–6.2)
SODIUM SERPL-SCNC: 138 MMOL/L (ref 136–145)
T4 FREE SERPL-MCNC: 0.9 NG/DL (ref 0.9–1.8)
WBC OTHER # BLD: 4.4 K/UL (ref 4–10.5)

## 2025-02-07 PROCEDURE — 80053 COMPREHEN METABOLIC PANEL: CPT

## 2025-02-07 PROCEDURE — 36415 COLL VENOUS BLD VENIPUNCTURE: CPT

## 2025-02-07 PROCEDURE — 6370000000 HC RX 637 (ALT 250 FOR IP): Performed by: INTERNAL MEDICINE

## 2025-02-07 PROCEDURE — 6360000002 HC RX W HCPCS: Performed by: INTERNAL MEDICINE

## 2025-02-07 PROCEDURE — 94761 N-INVAS EAR/PLS OXIMETRY MLT: CPT

## 2025-02-07 PROCEDURE — 82962 GLUCOSE BLOOD TEST: CPT

## 2025-02-07 PROCEDURE — 2700000000 HC OXYGEN THERAPY PER DAY

## 2025-02-07 PROCEDURE — 82550 ASSAY OF CK (CPK): CPT

## 2025-02-07 PROCEDURE — 97116 GAIT TRAINING THERAPY: CPT

## 2025-02-07 PROCEDURE — 97162 PT EVAL MOD COMPLEX 30 MIN: CPT

## 2025-02-07 PROCEDURE — 6370000000 HC RX 637 (ALT 250 FOR IP): Performed by: NURSE PRACTITIONER

## 2025-02-07 PROCEDURE — 85025 COMPLETE CBC W/AUTO DIFF WBC: CPT

## 2025-02-07 PROCEDURE — 1200000000 HC SEMI PRIVATE

## 2025-02-07 PROCEDURE — 84439 ASSAY OF FREE THYROXINE: CPT

## 2025-02-07 PROCEDURE — 2500000003 HC RX 250 WO HCPCS: Performed by: NURSE PRACTITIONER

## 2025-02-07 RX ADMIN — Medication 1000 UNITS: at 20:17

## 2025-02-07 RX ADMIN — SODIUM CHLORIDE, PRESERVATIVE FREE 10 ML: 5 INJECTION INTRAVENOUS at 09:59

## 2025-02-07 RX ADMIN — FERROUS SULFATE TAB 325 MG (65 MG ELEMENTAL FE) 325 MG: 325 (65 FE) TAB at 09:58

## 2025-02-07 RX ADMIN — ENOXAPARIN SODIUM 30 MG: 100 INJECTION SUBCUTANEOUS at 20:27

## 2025-02-07 RX ADMIN — LEVOTHYROXINE SODIUM 100 MCG: 0.1 TABLET ORAL at 06:24

## 2025-02-07 RX ADMIN — INSULIN GLARGINE 30 UNITS: 100 INJECTION, SOLUTION SUBCUTANEOUS at 20:38

## 2025-02-07 RX ADMIN — SODIUM CHLORIDE, PRESERVATIVE FREE 10 ML: 5 INJECTION INTRAVENOUS at 20:17

## 2025-02-07 RX ADMIN — PREGABALIN 150 MG: 75 CAPSULE ORAL at 20:17

## 2025-02-07 RX ADMIN — TAMSULOSIN HYDROCHLORIDE 0.4 MG: 0.4 CAPSULE ORAL at 20:17

## 2025-02-07 RX ADMIN — TORSEMIDE 15 MG: 20 TABLET ORAL at 20:17

## 2025-02-07 RX ADMIN — ASPIRIN 81 MG: 81 TABLET, CHEWABLE ORAL at 20:17

## 2025-02-07 RX ADMIN — PREGABALIN 150 MG: 75 CAPSULE ORAL at 09:58

## 2025-02-07 RX ADMIN — ENOXAPARIN SODIUM 30 MG: 100 INJECTION SUBCUTANEOUS at 09:59

## 2025-02-07 RX ADMIN — HYDROCODONE BITARTRATE AND ACETAMINOPHEN 1 TABLET: 5; 325 TABLET ORAL at 20:17

## 2025-02-07 ASSESSMENT — PAIN DESCRIPTION - LOCATION
LOCATION: BACK
LOCATION: BACK

## 2025-02-07 ASSESSMENT — PAIN SCALES - GENERAL
PAINLEVEL_OUTOF10: 3
PAINLEVEL_OUTOF10: 7
PAINLEVEL_OUTOF10: 5
PAINLEVEL_OUTOF10: 3

## 2025-02-07 ASSESSMENT — PAIN SCALES - WONG BAKER
WONGBAKER_NUMERICALRESPONSE: NO HURT

## 2025-02-07 ASSESSMENT — PAIN DESCRIPTION - ORIENTATION
ORIENTATION: MID
ORIENTATION: LOWER

## 2025-02-07 ASSESSMENT — PAIN DESCRIPTION - DESCRIPTORS
DESCRIPTORS: ACHING
DESCRIPTORS: THROBBING

## 2025-02-07 ASSESSMENT — PAIN - FUNCTIONAL ASSESSMENT: PAIN_FUNCTIONAL_ASSESSMENT: ACTIVITIES ARE NOT PREVENTED

## 2025-02-07 NOTE — CARE COORDINATION
Therapy recommendations noted. CM attempted to meet w/ patient in room. Patient resting with eyes closed and no family in room. Call to patients wife, Lanny. CM introduced self and explained role. CM explained recommendations for continued therapy at discharge. Lanny is in agreement for referral to swing bed. In network SNF list left in patient room for wife to review and pick back up SNF choice.     Perfect serve sent to Dr. Blankenship with referral information for swing bed review. Call to Darian Supervisor, Ainsley, and updated her with swing bed referral also.

## 2025-02-07 NOTE — CONSULTS
[facility] ACUTE CARE PHYSICAL THERAPY EVALUATION  Pino West, 1955, 4124/4124-A, 2/7/2025    History  [place]:  The encounter diagnosis was Syncope, unspecified syncope type.  Patient  has a past medical history of Adenomatous polyp of colon, Bulging discs, CABG (coronary artery bypass graft), CAD (coronary artery disease), Diabetes mellitus (HCC), H/O cardiovascular stress test, Hyperlipidemia, Hypotestosteronism, Lumbar facet arthropathy, Nausea & vomiting, Obstructive sleep apnea on CPAP, Prostate hypertrophy, Proteinuria, Screening colonoscopy, and Smoking hx.  Patient  has a past surgical history that includes Coronary artery bypass graft (2-2001); Cholecystectomy; Umbilical hernia repair; Anterior cruciate ligament repair; hernia repair; Tonsillectomy; Cardiac surgery; Pilonidal cyst excision; and Colonoscopy.    Discharge Recommendation: Facility for moderate post-acute rehabilitation, anticipate 1-2 hours per day and 5 days per week.     Equipment: TBD at next level of care    Subjective:    Patient states:  \"I think I'm doing alright.\"      Pain:  denies pain.      Communication with other providers:  Handoff to RN    Restrictions: general precautions, fall risk    Home Setup/Prior level of function  Social/Functional History  Lives With: Spouse  Type of Home: House  Home Layout: One level  Home Access: Level entry  Bathroom Shower/Tub: Walk-in shower  Bathroom Equipment: Shower chair  Bathroom Accessibility: Accessible  Home Equipment: Cane, Walker - Standard  Has the patient had two or more falls in the past year or any fall with injury in the past year?: Yes  Receives Help From: Family  Prior Level of Assist for ADLs: Independent  Prior Level of Assist for Homemaking: Independent  Homemaking Responsibilities: Yes  Prior Level of Assist for Ambulation: Independent household ambulator, with or without device  Prior Level of Assist for Transfers: Independent  Active :

## 2025-02-07 NOTE — PROGRESS NOTES
V2.0    [facility] [place]      Name:  Pino West /Age/Sex: 1955  (69 y.o. male)   MRN & CSN:  [id number] & [id number] Encounter Date/Time: 2025 2:11 PM EST   Location:  [address] [place] [address]-A PCP: Hai Yadav MD     Attending:Cami Blankenship MD       Hospital Day: 3    Assessment and Recommendations   Pino West is a 69 y.o. male with pmh of BPH, CAD s/p CABG x 3, DM-2, HLD, Hypotestosteronism, Hypothyroidism, NATHANAEL on CPAP, and Peripheral Neuropathy who presents with AMS (altered mental status)      Plan:     Nontraumatic rhabdomyolysis  -Was found in bathroom ground by his wife, confused  -CK is increased to 2473  -, ALT 63  -Slightly worse than yesterday  -Continue gentle hydration  -Trending CK and CMP     Acute encephalopathy (resolved)  -Chest x-ray no indication for pneumonia  -UA is negative  -White cell is normal  -No indication for infection  -DC antibiotics  -MRI of brain negative for acute stroke  -EEG unremarkable  -Appreciate neurology input, follow-up outpatient for ambulatory EEG, sign off     Hypoxia  -On 2 L NC in admission  -Possible due to NATHANAEL because patient was sleepy  -Keep monitor     DM-2 with Peripheral Neuropathy   -HOLD oral diabetes meds, cont Lyrica 150 mg bid   -POCT Glucose Qac&hs, Med Dose ISS, cont long acting insulin qhs (Lantus 30, home dose is Tresiba 45 u)      Hypertension  -Cont torsemide 15 mg      Hyperlipidemia, CAD and hx of CABG  -Cont Lipitor 20 mg and ASA 81 mg      Hypothyroidism  -Cont levothyroxine 100 mcg   -Elev TSH 4.32, add Free T4 is slightly diminished at 0.8  -Repeat T4 free in a.m.       BPH  -Cont Flomax     NATHANAEL on CPAP  -No longer using     Vit D Deficiency   -Cont Vit D 1000 u qd       Diet ADULT DIET; Regular; 4 carb choices (60 gm/meal); No Added Salt (3-4 gm)   DVT Prophylaxis [x] Lovenox, []  Heparin, [] SCDs, [] Ambulation,  [] Eliquis, [] Xarelto  [] Coumadin   Code Status Full Code   Disposition From: Home  Expected Disposition:

## 2025-02-07 NOTE — PROGRESS NOTES
Clinical Pharmacy Progress Note    Vancomycin has been discontinued by provider yesterday. Pharmacy will sign off. Please re-consult pharmacy if dosing is wanted in the future.    Please call with questions.  Heather Larios RPH

## 2025-02-08 LAB
ALBUMIN SERPL-MCNC: 3.7 G/DL (ref 3.4–5)
ALBUMIN/GLOB SERPL: 1.3 {RATIO} (ref 1.1–2.2)
ALP SERPL-CCNC: 79 U/L (ref 40–129)
ALT SERPL-CCNC: 75 U/L (ref 10–40)
ANION GAP SERPL CALCULATED.3IONS-SCNC: 13 MMOL/L (ref 9–17)
AST SERPL-CCNC: 139 U/L (ref 15–37)
BASOPHILS # BLD: 0 K/UL
BASOPHILS NFR BLD: 0 % (ref 0–1)
BILIRUB SERPL-MCNC: 0.4 MG/DL (ref 0–1)
BUN SERPL-MCNC: 25 MG/DL (ref 7–20)
CALCIUM SERPL-MCNC: 8.8 MG/DL (ref 8.3–10.6)
CHLORIDE SERPL-SCNC: 100 MMOL/L (ref 99–110)
CK SERPL-CCNC: 923 U/L (ref 26–192)
CO2 SERPL-SCNC: 26 MMOL/L (ref 21–32)
CREAT SERPL-MCNC: 1.4 MG/DL (ref 0.8–1.3)
EOSINOPHIL # BLD: 0.11 K/UL
EOSINOPHILS RELATIVE PERCENT: 2 % (ref 0–3)
ERYTHROCYTE [DISTWIDTH] IN BLOOD BY AUTOMATED COUNT: 14.3 % (ref 11.7–14.9)
GFR, ESTIMATED: 50 ML/MIN/1.73M2
GLUCOSE BLD-MCNC: 123 MG/DL (ref 74–99)
GLUCOSE BLD-MCNC: 169 MG/DL (ref 74–99)
GLUCOSE BLD-MCNC: 194 MG/DL (ref 74–99)
GLUCOSE BLD-MCNC: 209 MG/DL (ref 74–99)
GLUCOSE SERPL-MCNC: 116 MG/DL (ref 74–99)
HCT VFR BLD AUTO: 37.1 % (ref 42–52)
HGB BLD-MCNC: 11.9 G/DL (ref 13.5–18)
LYMPHOCYTES NFR BLD: 2.39 K/UL
LYMPHOCYTES RELATIVE PERCENT: 42 % (ref 24–44)
MCH RBC QN AUTO: 29 PG (ref 27–31)
MCHC RBC AUTO-ENTMCNC: 32.1 G/DL (ref 32–36)
MCV RBC AUTO: 90.5 FL (ref 78–100)
METAMYELOCYTES ABSOLUTE COUNT: 0.06 K/UL
METAMYELOCYTES: 1 %
MONOCYTES NFR BLD: 0.97 K/UL
MONOCYTES NFR BLD: 17 % (ref 0–4)
NEUTROPHILS NFR BLD: 38 % (ref 36–66)
NEUTS SEG NFR BLD: 2.17 K/UL
PLATELET # BLD AUTO: 114 K/UL (ref 140–440)
PLATELET ESTIMATE: NORMAL
PMV BLD AUTO: 10 FL (ref 7.5–11.1)
POTASSIUM SERPL-SCNC: 3.8 MMOL/L (ref 3.5–5.1)
PROT SERPL-MCNC: 6.6 G/DL (ref 6.4–8.2)
RBC # BLD AUTO: 4.1 M/UL (ref 4.6–6.2)
RBC # BLD: NORMAL 10*6/UL
SODIUM SERPL-SCNC: 140 MMOL/L (ref 136–145)
WBC # BLD: NORMAL 10*3/UL
WBC OTHER # BLD: 5.7 K/UL (ref 4–10.5)

## 2025-02-08 PROCEDURE — 80053 COMPREHEN METABOLIC PANEL: CPT

## 2025-02-08 PROCEDURE — 94761 N-INVAS EAR/PLS OXIMETRY MLT: CPT

## 2025-02-08 PROCEDURE — 82962 GLUCOSE BLOOD TEST: CPT

## 2025-02-08 PROCEDURE — 6360000002 HC RX W HCPCS: Performed by: INTERNAL MEDICINE

## 2025-02-08 PROCEDURE — 85025 COMPLETE CBC W/AUTO DIFF WBC: CPT

## 2025-02-08 PROCEDURE — 6370000000 HC RX 637 (ALT 250 FOR IP): Performed by: NURSE PRACTITIONER

## 2025-02-08 PROCEDURE — 1200000000 HC SEMI PRIVATE

## 2025-02-08 PROCEDURE — 6370000000 HC RX 637 (ALT 250 FOR IP): Performed by: INTERNAL MEDICINE

## 2025-02-08 PROCEDURE — 2500000003 HC RX 250 WO HCPCS: Performed by: INTERNAL MEDICINE

## 2025-02-08 PROCEDURE — 2500000003 HC RX 250 WO HCPCS: Performed by: NURSE PRACTITIONER

## 2025-02-08 PROCEDURE — 82550 ASSAY OF CK (CPK): CPT

## 2025-02-08 PROCEDURE — 36415 COLL VENOUS BLD VENIPUNCTURE: CPT

## 2025-02-08 PROCEDURE — 2700000000 HC OXYGEN THERAPY PER DAY

## 2025-02-08 RX ADMIN — TAMSULOSIN HYDROCHLORIDE 0.4 MG: 0.4 CAPSULE ORAL at 22:26

## 2025-02-08 RX ADMIN — INSULIN LISPRO 2 UNITS: 100 INJECTION, SOLUTION INTRAVENOUS; SUBCUTANEOUS at 22:35

## 2025-02-08 RX ADMIN — PREGABALIN 150 MG: 75 CAPSULE ORAL at 22:26

## 2025-02-08 RX ADMIN — ENOXAPARIN SODIUM 30 MG: 100 INJECTION SUBCUTANEOUS at 10:53

## 2025-02-08 RX ADMIN — INSULIN LISPRO 2 UNITS: 100 INJECTION, SOLUTION INTRAVENOUS; SUBCUTANEOUS at 12:54

## 2025-02-08 RX ADMIN — PREGABALIN 150 MG: 75 CAPSULE ORAL at 10:53

## 2025-02-08 RX ADMIN — LEVOTHYROXINE SODIUM 100 MCG: 0.1 TABLET ORAL at 10:53

## 2025-02-08 RX ADMIN — SODIUM CHLORIDE, PRESERVATIVE FREE 10 ML: 5 INJECTION INTRAVENOUS at 10:54

## 2025-02-08 RX ADMIN — INSULIN GLARGINE 30 UNITS: 100 INJECTION, SOLUTION SUBCUTANEOUS at 22:27

## 2025-02-08 RX ADMIN — HYDROCODONE BITARTRATE AND ACETAMINOPHEN 1 TABLET: 5; 325 TABLET ORAL at 22:34

## 2025-02-08 RX ADMIN — ENOXAPARIN SODIUM 30 MG: 100 INJECTION SUBCUTANEOUS at 22:26

## 2025-02-08 RX ADMIN — FERROUS SULFATE TAB 325 MG (65 MG ELEMENTAL FE) 325 MG: 325 (65 FE) TAB at 10:53

## 2025-02-08 RX ADMIN — TORSEMIDE 15 MG: 20 TABLET ORAL at 22:26

## 2025-02-08 RX ADMIN — SODIUM CHLORIDE, PRESERVATIVE FREE 5 ML: 5 INJECTION INTRAVENOUS at 22:27

## 2025-02-08 RX ADMIN — ASPIRIN 81 MG: 81 TABLET, CHEWABLE ORAL at 22:27

## 2025-02-08 RX ADMIN — Medication 1000 UNITS: at 22:26

## 2025-02-08 ASSESSMENT — PAIN DESCRIPTION - DESCRIPTORS
DESCRIPTORS: THROBBING
DESCRIPTORS: THROBBING

## 2025-02-08 ASSESSMENT — PAIN - FUNCTIONAL ASSESSMENT
PAIN_FUNCTIONAL_ASSESSMENT: ACTIVITIES ARE NOT PREVENTED
PAIN_FUNCTIONAL_ASSESSMENT: ACTIVITIES ARE NOT PREVENTED

## 2025-02-08 ASSESSMENT — PAIN DESCRIPTION - ORIENTATION
ORIENTATION: LOWER
ORIENTATION: LOWER

## 2025-02-08 ASSESSMENT — PAIN DESCRIPTION - LOCATION
LOCATION: BACK
LOCATION: BACK

## 2025-02-08 ASSESSMENT — PAIN SCALES - GENERAL
PAINLEVEL_OUTOF10: 2
PAINLEVEL_OUTOF10: 7
PAINLEVEL_OUTOF10: 0

## 2025-02-08 ASSESSMENT — PAIN SCALES - WONG BAKER: WONGBAKER_NUMERICALRESPONSE: NO HURT

## 2025-02-08 NOTE — PROGRESS NOTES
V2.0    [facility] [place] Progress Note      Name:  Pino West /Age/Sex: 1955  (69 y.o. male)   MRN & CSN:  [id number] & [id number] Encounter Date/Time: 2025 4:25 PM EST   Location:  [address] [place] [address]-A PCP: Hai Yadav MD     Attending:Cami Blankenship MD       Hospital Day: 4    Assessment and Recommendations   Pino West is a 69 y.o. male with pmh of BPH, CAD s/p CABG x 3, DM-2, HLD, Hypotestosteronism, Hypothyroidism, NATHANAEL on CPAP, and Peripheral Neuropathy who presents with AMS (altered mental status)      Plan:     Nontraumatic rhabdomyolysis, improving  -Was found in bathroom ground by his wife, confused  -CK is 2473--> 1,606-->923  --->139, ALT 63-->75  -Slightly worse than yesterday  -Continue gentle hydration  -Trending CK and CMP  -Swing bed pending     Acute encephalopathy (resolved)  -Chest x-ray no indication for pneumonia  -UA is negative  -White cell is normal  -No indication for infection  -DC antibiotics  -MRI of brain negative for acute stroke  -EEG unremarkable  -Appreciate neurology input, follow-up outpatient for ambulatory EEG, sign off     Hypoxia  -On 2 L NC in admission, down to 1  -Possible due to NATHANAEL because patient was sleepy  -Keep monitor     DM-2 with Peripheral Neuropathy   -HOLD oral diabetes meds, cont Lyrica 150 mg bid   -POCT Glucose Qac&hs, Med Dose ISS, cont long acting insulin qhs (Lantus 30, home dose is Tresiba 45 u)      Hypertension  -Cont torsemide 15 mg      Hyperlipidemia, CAD and hx of CABG  -Cont Lipitor 20 mg and ASA 81 mg      Hypothyroidism  -Cont levothyroxine 100 mcg   -Elev TSH 4.32, add Free T4 is slightly diminished at 0.8  -Repeat T4 free in a.m.        BPH  -Cont Flomax      NATHANAEL on CPAP  -No longer using     Vit D Deficiency   -Cont Vit D 1000 u qd       Diet ADULT DIET; Regular; 4 carb choices (60 gm/meal); No Added Salt (3-4 gm)   DVT Prophylaxis [x] Lovenox, []  Heparin, [] SCDs, [] Ambulation,  [] Eliquis, [] Xarelto  [] Coumadin   Code Status

## 2025-02-08 NOTE — PLAN OF CARE
Problem: Chronic Conditions and Co-morbidities  Goal: Patient's chronic conditions and co-morbidity symptoms are monitored and maintained or improved  Outcome: Progressing     Problem: Discharge Planning  Goal: Discharge to home or other facility with appropriate resources  Outcome: Progressing     Problem: Safety - Adult  Goal: Free from fall injury  Outcome: Progressing     Problem: Pain  Goal: Verbalizes/displays adequate comfort level or baseline comfort level  Outcome: Progressing     Problem: Skin/Tissue Integrity  Goal: Skin integrity remains intact  Description: 1.  Monitor for areas of redness and/or skin breakdown  2.  Assess vascular access sites hourly  3.  Every 4-6 hours minimum:  Change oxygen saturation probe site  4.  Every 4-6 hours:  If on nasal continuous positive airway pressure, respiratory therapy assess nares and determine need for appliance change or resting period  Outcome: Progressing

## 2025-02-08 NOTE — PLAN OF CARE
Problem: Chronic Conditions and Co-morbidities  Goal: Patient's chronic conditions and co-morbidity symptoms are monitored and maintained or improved  2/7/2025 2057 by Kenna Coto LPN  Outcome: Progressing  2/7/2025 1601 by Jameson Griffin LPN  Outcome: Progressing     Problem: Discharge Planning  Goal: Discharge to home or other facility with appropriate resources  2/7/2025 2057 by Kenna Coto LPN  Outcome: Progressing  2/7/2025 1601 by Jameson Griffin LPN  Outcome: Progressing     Problem: Safety - Adult  Goal: Free from fall injury  2/7/2025 2057 by Kenna Coto LPN  Outcome: Progressing  2/7/2025 1601 by Jameson Griffin LPN  Outcome: Progressing     Problem: Pain  Goal: Verbalizes/displays adequate comfort level or baseline comfort level  2/7/2025 2057 by Kenna Coto LPN  Outcome: Progressing  2/7/2025 1601 by Jameson Griffin LPN  Outcome: Progressing     Problem: Skin/Tissue Integrity  Goal: Skin integrity remains intact  Description: 1.  Monitor for areas of redness and/or skin breakdown  2.  Assess vascular access sites hourly  3.  Every 4-6 hours minimum:  Change oxygen saturation probe site  4.  Every 4-6 hours:  If on nasal continuous positive airway pressure, respiratory therapy assess nares and determine need for appliance change or resting period  Outcome: Progressing

## 2025-02-09 ENCOUNTER — HOSPITAL ENCOUNTER (INPATIENT)
Age: 70
LOS: 5 days | Discharge: HOME | End: 2025-02-14
Attending: HOSPITALIST | Admitting: HOSPITALIST
Payer: OTHER GOVERNMENT

## 2025-02-09 VITALS
HEART RATE: 79 BPM | RESPIRATION RATE: 16 BRPM | TEMPERATURE: 97.9 F | SYSTOLIC BLOOD PRESSURE: 123 MMHG | DIASTOLIC BLOOD PRESSURE: 65 MMHG | BODY MASS INDEX: 37.56 KG/M2 | HEIGHT: 68 IN | WEIGHT: 247.8 LBS | OXYGEN SATURATION: 93 %

## 2025-02-09 LAB
ALBUMIN SERPL-MCNC: 3.7 G/DL (ref 3.4–5)
ALBUMIN/GLOB SERPL: 1.4 {RATIO} (ref 1.1–2.2)
ALP SERPL-CCNC: 76 U/L (ref 40–129)
ALT SERPL-CCNC: 86 U/L (ref 10–40)
ANION GAP SERPL CALCULATED.3IONS-SCNC: 11 MMOL/L (ref 9–17)
AST SERPL-CCNC: 113 U/L (ref 15–37)
BILIRUB SERPL-MCNC: 0.5 MG/DL (ref 0–1)
BUN SERPL-MCNC: 23 MG/DL (ref 7–20)
CALCIUM SERPL-MCNC: 8.7 MG/DL (ref 8.3–10.6)
CHLORIDE SERPL-SCNC: 100 MMOL/L (ref 99–110)
CO2 SERPL-SCNC: 30 MMOL/L (ref 21–32)
CREAT SERPL-MCNC: 1.1 MG/DL (ref 0.8–1.3)
ERYTHROCYTE [DISTWIDTH] IN BLOOD BY AUTOMATED COUNT: 14 % (ref 11.7–14.9)
GFR, ESTIMATED: 64 ML/MIN/1.73M2
GLUCOSE BLD-MCNC: 136 MG/DL (ref 74–99)
GLUCOSE BLD-MCNC: 193 MG/DL (ref 74–99)
GLUCOSE BLD-MCNC: 233 MG/DL (ref 74–99)
GLUCOSE BLD-MCNC: 237 MG/DL (ref 74–99)
GLUCOSE SERPL-MCNC: 117 MG/DL (ref 74–99)
HCT VFR BLD AUTO: 34.1 % (ref 42–52)
HGB BLD-MCNC: 11.3 G/DL (ref 13.5–18)
MCH RBC QN AUTO: 29.1 PG (ref 27–31)
MCHC RBC AUTO-ENTMCNC: 33.1 G/DL (ref 32–36)
MCV RBC AUTO: 87.9 FL (ref 78–100)
MICROORGANISM SPEC CULT: NORMAL
MICROORGANISM SPEC CULT: NORMAL
PLATELET # BLD AUTO: 141 K/UL (ref 140–440)
PMV BLD AUTO: 10.1 FL (ref 7.5–11.1)
POTASSIUM SERPL-SCNC: 3.4 MMOL/L (ref 3.5–5.1)
PROT SERPL-MCNC: 6.3 G/DL (ref 6.4–8.2)
RBC # BLD AUTO: 3.88 M/UL (ref 4.6–6.2)
SERVICE CMNT-IMP: NORMAL
SERVICE CMNT-IMP: NORMAL
SODIUM SERPL-SCNC: 140 MMOL/L (ref 136–145)
SPECIMEN DESCRIPTION: NORMAL
SPECIMEN DESCRIPTION: NORMAL
WBC OTHER # BLD: 5.3 K/UL (ref 4–10.5)

## 2025-02-09 PROCEDURE — 2500000003 HC RX 250 WO HCPCS: Performed by: NURSE PRACTITIONER

## 2025-02-09 PROCEDURE — 36415 COLL VENOUS BLD VENIPUNCTURE: CPT

## 2025-02-09 PROCEDURE — 97110 THERAPEUTIC EXERCISES: CPT

## 2025-02-09 PROCEDURE — 97116 GAIT TRAINING THERAPY: CPT

## 2025-02-09 PROCEDURE — 82962 GLUCOSE BLOOD TEST: CPT

## 2025-02-09 PROCEDURE — 6370000000 HC RX 637 (ALT 250 FOR IP): Performed by: INTERNAL MEDICINE

## 2025-02-09 PROCEDURE — 6370000000 HC RX 637 (ALT 250 FOR IP): Performed by: NURSE PRACTITIONER

## 2025-02-09 PROCEDURE — 85027 COMPLETE CBC AUTOMATED: CPT

## 2025-02-09 PROCEDURE — 6360000002 HC RX W HCPCS: Performed by: INTERNAL MEDICINE

## 2025-02-09 PROCEDURE — 80053 COMPREHEN METABOLIC PANEL: CPT

## 2025-02-09 PROCEDURE — 6360000002 HC RX W HCPCS: Performed by: NURSE PRACTITIONER

## 2025-02-09 PROCEDURE — 2500000003 HC RX 250 WO HCPCS: Performed by: INTERNAL MEDICINE

## 2025-02-09 PROCEDURE — 1200000002 HC SEMI PRIVATE SWING BED

## 2025-02-09 RX ORDER — TORSEMIDE 10 MG/1
15 TABLET ORAL DAILY
Status: DISCONTINUED | OUTPATIENT
Start: 2025-02-10 | End: 2025-02-13 | Stop reason: ALTCHOICE

## 2025-02-09 RX ORDER — VITAMIN B COMPLEX
1000 TABLET ORAL DAILY
Status: DISCONTINUED | OUTPATIENT
Start: 2025-02-10 | End: 2025-02-14 | Stop reason: HOSPADM

## 2025-02-09 RX ORDER — BISACODYL 5 MG/1
5 TABLET, DELAYED RELEASE ORAL DAILY PRN
Status: DISCONTINUED | OUTPATIENT
Start: 2025-02-09 | End: 2025-02-14 | Stop reason: HOSPADM

## 2025-02-09 RX ORDER — GLUCAGON 1 MG/ML
1 KIT INJECTION PRN
Status: DISCONTINUED | OUTPATIENT
Start: 2025-02-09 | End: 2025-02-14 | Stop reason: HOSPADM

## 2025-02-09 RX ORDER — ENOXAPARIN SODIUM 100 MG/ML
30 INJECTION SUBCUTANEOUS 2 TIMES DAILY
Status: DISCONTINUED | OUTPATIENT
Start: 2025-02-09 | End: 2025-02-14 | Stop reason: HOSPADM

## 2025-02-09 RX ORDER — BISACODYL 5 MG/1
5 TABLET, DELAYED RELEASE ORAL DAILY PRN
Status: CANCELLED | OUTPATIENT
Start: 2025-02-09

## 2025-02-09 RX ORDER — TORSEMIDE 20 MG/1
15 TABLET ORAL NIGHTLY
Status: CANCELLED | OUTPATIENT
Start: 2025-02-09

## 2025-02-09 RX ORDER — PREGABALIN 75 MG/1
150 CAPSULE ORAL 2 TIMES DAILY
Status: DISCONTINUED | OUTPATIENT
Start: 2025-02-09 | End: 2025-02-14 | Stop reason: HOSPADM

## 2025-02-09 RX ORDER — INSULIN LISPRO 100 [IU]/ML
0-8 INJECTION, SOLUTION INTRAVENOUS; SUBCUTANEOUS
Status: DISCONTINUED | OUTPATIENT
Start: 2025-02-09 | End: 2025-02-14 | Stop reason: HOSPADM

## 2025-02-09 RX ORDER — ACETAMINOPHEN 325 MG/1
650 TABLET ORAL EVERY 6 HOURS PRN
Status: CANCELLED | OUTPATIENT
Start: 2025-02-09

## 2025-02-09 RX ORDER — LEVOTHYROXINE SODIUM 100 UG/1
100 TABLET ORAL DAILY
Status: DISCONTINUED | OUTPATIENT
Start: 2025-02-10 | End: 2025-02-14 | Stop reason: HOSPADM

## 2025-02-09 RX ORDER — FERROUS SULFATE 325(65) MG
325 TABLET ORAL
Status: CANCELLED | OUTPATIENT
Start: 2025-02-10

## 2025-02-09 RX ORDER — GLUCAGON 1 MG/ML
1 KIT INJECTION PRN
Status: CANCELLED | OUTPATIENT
Start: 2025-02-09

## 2025-02-09 RX ORDER — TAMSULOSIN HYDROCHLORIDE 0.4 MG/1
0.4 CAPSULE ORAL NIGHTLY
Status: CANCELLED | OUTPATIENT
Start: 2025-02-09

## 2025-02-09 RX ORDER — ACETAMINOPHEN 325 MG/1
650 TABLET ORAL EVERY 6 HOURS PRN
Status: DISCONTINUED | OUTPATIENT
Start: 2025-02-09 | End: 2025-02-14 | Stop reason: HOSPADM

## 2025-02-09 RX ORDER — ENOXAPARIN SODIUM 100 MG/ML
30 INJECTION SUBCUTANEOUS 2 TIMES DAILY
Status: CANCELLED | OUTPATIENT
Start: 2025-02-09

## 2025-02-09 RX ORDER — TORSEMIDE 10 MG/1
15 TABLET ORAL NIGHTLY
Status: DISCONTINUED | OUTPATIENT
Start: 2025-02-09 | End: 2025-02-09

## 2025-02-09 RX ORDER — VITAMIN B COMPLEX
1000 TABLET ORAL NIGHTLY
Status: CANCELLED | OUTPATIENT
Start: 2025-02-09

## 2025-02-09 RX ORDER — FERROUS SULFATE 325(65) MG
325 TABLET ORAL
Status: DISCONTINUED | OUTPATIENT
Start: 2025-02-10 | End: 2025-02-14 | Stop reason: HOSPADM

## 2025-02-09 RX ORDER — ASPIRIN 81 MG/1
81 TABLET, CHEWABLE ORAL NIGHTLY
Status: CANCELLED | OUTPATIENT
Start: 2025-02-09

## 2025-02-09 RX ORDER — ACETAMINOPHEN 650 MG/1
650 SUPPOSITORY RECTAL EVERY 6 HOURS PRN
Status: CANCELLED | OUTPATIENT
Start: 2025-02-09

## 2025-02-09 RX ORDER — VITAMIN B COMPLEX
1000 TABLET ORAL NIGHTLY
Status: DISCONTINUED | OUTPATIENT
Start: 2025-02-09 | End: 2025-02-09

## 2025-02-09 RX ORDER — HYDROCODONE BITARTRATE AND ACETAMINOPHEN 5; 325 MG/1; MG/1
1 TABLET ORAL 3 TIMES DAILY PRN
Status: CANCELLED | OUTPATIENT
Start: 2025-02-09

## 2025-02-09 RX ORDER — LEVOTHYROXINE SODIUM 100 UG/1
100 TABLET ORAL DAILY
Status: CANCELLED | OUTPATIENT
Start: 2025-02-10

## 2025-02-09 RX ORDER — INSULIN LISPRO 100 [IU]/ML
0-8 INJECTION, SOLUTION INTRAVENOUS; SUBCUTANEOUS
Status: CANCELLED | OUTPATIENT
Start: 2025-02-09

## 2025-02-09 RX ORDER — POLYETHYLENE GLYCOL 3350 17 G/17G
17 POWDER, FOR SOLUTION ORAL DAILY PRN
Status: CANCELLED | OUTPATIENT
Start: 2025-02-09

## 2025-02-09 RX ORDER — POLYETHYLENE GLYCOL 3350 17 G/17G
17 POWDER, FOR SOLUTION ORAL DAILY PRN
Status: DISCONTINUED | OUTPATIENT
Start: 2025-02-09 | End: 2025-02-14 | Stop reason: HOSPADM

## 2025-02-09 RX ORDER — PREGABALIN 75 MG/1
150 CAPSULE ORAL 2 TIMES DAILY
Status: CANCELLED | OUTPATIENT
Start: 2025-02-09

## 2025-02-09 RX ORDER — TAMSULOSIN HYDROCHLORIDE 0.4 MG/1
0.4 CAPSULE ORAL NIGHTLY
Status: DISCONTINUED | OUTPATIENT
Start: 2025-02-09 | End: 2025-02-14 | Stop reason: HOSPADM

## 2025-02-09 RX ORDER — HYDROCODONE BITARTRATE AND ACETAMINOPHEN 5; 325 MG/1; MG/1
1 TABLET ORAL ONCE
Status: COMPLETED | OUTPATIENT
Start: 2025-02-09 | End: 2025-02-09

## 2025-02-09 RX ORDER — ASPIRIN 81 MG/1
81 TABLET, CHEWABLE ORAL DAILY
Status: DISCONTINUED | OUTPATIENT
Start: 2025-02-10 | End: 2025-02-10 | Stop reason: ALTCHOICE

## 2025-02-09 RX ORDER — INSULIN GLARGINE 100 [IU]/ML
30 INJECTION, SOLUTION SUBCUTANEOUS NIGHTLY
Status: CANCELLED | OUTPATIENT
Start: 2025-02-09

## 2025-02-09 RX ORDER — HYDROCODONE BITARTRATE AND ACETAMINOPHEN 5; 325 MG/1; MG/1
1 TABLET ORAL 3 TIMES DAILY PRN
Status: DISCONTINUED | OUTPATIENT
Start: 2025-02-09 | End: 2025-02-14 | Stop reason: HOSPADM

## 2025-02-09 RX ORDER — INSULIN GLARGINE 100 [IU]/ML
30 INJECTION, SOLUTION SUBCUTANEOUS NIGHTLY
Status: DISCONTINUED | OUTPATIENT
Start: 2025-02-09 | End: 2025-02-14 | Stop reason: HOSPADM

## 2025-02-09 RX ORDER — ACETAMINOPHEN 650 MG/1
650 SUPPOSITORY RECTAL EVERY 6 HOURS PRN
Status: DISCONTINUED | OUTPATIENT
Start: 2025-02-09 | End: 2025-02-14 | Stop reason: HOSPADM

## 2025-02-09 RX ORDER — ASPIRIN 81 MG/1
81 TABLET, CHEWABLE ORAL NIGHTLY
Status: DISCONTINUED | OUTPATIENT
Start: 2025-02-09 | End: 2025-02-09

## 2025-02-09 RX ADMIN — INSULIN GLARGINE 30 UNITS: 100 INJECTION, SOLUTION SUBCUTANEOUS at 22:23

## 2025-02-09 RX ADMIN — INSULIN LISPRO 2 UNITS: 100 INJECTION, SOLUTION INTRAVENOUS; SUBCUTANEOUS at 22:26

## 2025-02-09 RX ADMIN — PREGABALIN 150 MG: 75 CAPSULE ORAL at 22:23

## 2025-02-09 RX ADMIN — HYDROCODONE BITARTRATE AND ACETAMINOPHEN 1 TABLET: 5; 325 TABLET ORAL at 11:30

## 2025-02-09 RX ADMIN — SODIUM CHLORIDE, PRESERVATIVE FREE 10 ML: 5 INJECTION INTRAVENOUS at 11:32

## 2025-02-09 RX ADMIN — PREGABALIN 150 MG: 75 CAPSULE ORAL at 11:30

## 2025-02-09 RX ADMIN — LEVOTHYROXINE SODIUM 100 MCG: 0.1 TABLET ORAL at 06:11

## 2025-02-09 RX ADMIN — ENOXAPARIN SODIUM 30 MG: 100 INJECTION SUBCUTANEOUS at 22:23

## 2025-02-09 RX ADMIN — TAMSULOSIN HYDROCHLORIDE 0.4 MG: 0.4 CAPSULE ORAL at 22:23

## 2025-02-09 RX ADMIN — FERROUS SULFATE TAB 325 MG (65 MG ELEMENTAL FE) 325 MG: 325 (65 FE) TAB at 11:30

## 2025-02-09 RX ADMIN — INSULIN LISPRO 2 UNITS: 100 INJECTION, SOLUTION INTRAVENOUS; SUBCUTANEOUS at 17:49

## 2025-02-09 RX ADMIN — SODIUM CHLORIDE, PRESERVATIVE FREE 10 ML: 5 INJECTION INTRAVENOUS at 11:30

## 2025-02-09 RX ADMIN — ENOXAPARIN SODIUM 30 MG: 100 INJECTION SUBCUTANEOUS at 11:30

## 2025-02-09 RX ADMIN — HYDROCODONE BITARTRATE AND ACETAMINOPHEN 1 TABLET: 5; 325 TABLET ORAL at 16:54

## 2025-02-09 ASSESSMENT — PAIN DESCRIPTION - ORIENTATION
ORIENTATION: LOWER
ORIENTATION: LOWER

## 2025-02-09 ASSESSMENT — PAIN DESCRIPTION - LOCATION
LOCATION: BACK
LOCATION: BACK

## 2025-02-09 ASSESSMENT — PAIN DESCRIPTION - DESCRIPTORS
DESCRIPTORS: ACHING
DESCRIPTORS: THROBBING

## 2025-02-09 ASSESSMENT — PAIN - FUNCTIONAL ASSESSMENT
PAIN_FUNCTIONAL_ASSESSMENT: PREVENTS OR INTERFERES SOME ACTIVE ACTIVITIES AND ADLS
PAIN_FUNCTIONAL_ASSESSMENT: ACTIVITIES ARE NOT PREVENTED

## 2025-02-09 ASSESSMENT — LIFESTYLE VARIABLES
HOW OFTEN DO YOU HAVE A DRINK CONTAINING ALCOHOL: MONTHLY OR LESS
HOW MANY STANDARD DRINKS CONTAINING ALCOHOL DO YOU HAVE ON A TYPICAL DAY: 1 OR 2

## 2025-02-09 ASSESSMENT — PAIN SCALES - GENERAL
PAINLEVEL_OUTOF10: 9
PAINLEVEL_OUTOF10: 6

## 2025-02-09 NOTE — PLAN OF CARE
Problem: Chronic Conditions and Co-morbidities  Goal: Patient's chronic conditions and co-morbidity symptoms are monitored and maintained or improved  2/9/2025 1324 by Maeve Stein RN  Outcome: Progressing  2/9/2025 0445 by Edwina Abbasi LPN  Outcome: Progressing     Problem: Discharge Planning  Goal: Discharge to home or other facility with appropriate resources  2/9/2025 1324 by Maeve Stein RN  Outcome: Progressing  2/9/2025 0445 by Edwina Abbasi LPN  Outcome: Progressing     Problem: Safety - Adult  Goal: Free from fall injury  2/9/2025 1324 by Maeve Stein RN  Outcome: Progressing  2/9/2025 0445 by Edwina Abbasi LPN  Outcome: Progressing     Problem: Pain  Goal: Verbalizes/displays adequate comfort level or baseline comfort level  2/9/2025 1324 by Maeve Stein RN  Outcome: Progressing  2/9/2025 0445 by Edwina Abbasi LPN  Outcome: Progressing     Problem: Skin/Tissue Integrity  Goal: Skin integrity remains intact  Description: 1.  Monitor for areas of redness and/or skin breakdown  2.  Assess vascular access sites hourly  3.  Every 4-6 hours minimum:  Change oxygen saturation probe site  4.  Every 4-6 hours:  If on nasal continuous positive airway pressure, respiratory therapy assess nares and determine need for appliance change or resting period  2/9/2025 1324 by Maeve Stein RN  Outcome: Progressing  Flowsheets (Taken 2/9/2025 1323)  Skin Integrity Remains Intact: Monitor for areas of redness and/or skin breakdown  2/9/2025 0445 by Edwina Abbasi LPN  Outcome: Progressing

## 2025-02-09 NOTE — PROGRESS NOTES
Physical Therapy Treatment Note  Name: Pino West MRN: [id number] :   1955   Date:  2025   Admission Date: 2025 Room:  [address] [place] [address]   Restrictions/Precautions:general precautions, fall risk   Communication with other providers:  Pt okay to see for therapy per RN   Subjective:  Patient states:  \"You can ask the boss'   Pain:   Location, Type, Intensity (0/10 to 10/10):  Denies   Objective:    Observation:  Pt sitting up in recliner upon PTA arrival.   Objective Measures:  Tele, stable  Treatment, including education/measures:    Therapeutic Activity Training:   Therapeutic activity training was instructed today.  Cues were given for safety, sequence, UE/LE placement, awareness, and balance.    Activities performed today included STS.     Mobility:  STS: CGA from recliner.     Gait:  Gait training conducted for ~100ft with RW and CGA for safety. Pt demo lateral postural sway, decreased heel strike, mild R lateral instability but improved with use of RW. PTA provided cues and education on postural control for improved gait stability.     Exercises:  Pt performed seated APs, marching, hip add with pillow, hip abd with RTB. LAQ, HS curl with use of leg rest, glut sets 1 x 15 ea. With exercise handout provided for increased carryover.     Safety:   Pt returned safely to recliner with chair alarm activated, call light in reach, all needs met.   Assessment / Impression:    Pt tolerated OOB Activities well this date but with Gait deficits noted.   Patient's tolerance of treatment:  Good   Adverse Reaction: none  Significant change in status and impact:  none  Barriers to improvement:  none  Plan for Next Session:    Plan to continue OOB activities.   Time in:  1424  Time out:  1453  Timed treatment minutes:  29  Total treatment time:  29    Previously filed items:  Social/Functional History  Lives With: Spouse  Type of Home: House  Home Layout: One level  Home Access: Level entry  Bathroom Shower/Tub:

## 2025-02-09 NOTE — DISCHARGE SUMMARY
V2.0  Discharge Summary    Name:  Pino West /Age/Sex: 1955 (69 y.o. male)   Admit Date: 2025  Discharge Date: 25    MRN & CSN:  [id number] & [id number] Encounter Date and Time 25 2:55 PM EST    Attending:  Cami Blankenship MD Discharging Provider: [facility]       Hospital Course:     Brief HPI: Pino West is a 69 y.o. male who presented after being found down on bathroom floor by spouse noted be acutely confused and tremulous on .  Neurology evaluation for concern of seizure-like activity.  He underwent neurodiagnostic include MRI which was nonacute and EEG and not indicative of seizure.  Recommended outpatient follow-up for ambulatory EEG.  PT/OT recommended postacute rehab and he was accepted to the swing bed program for rehab .      Nontraumatic rhabdomyolysis, improving  -Was found in bathroom acute confused, having tremors  -CK is 2473--> 1,606-->923  --->139, ALT 63-->75  -Continue gentle hydration  -CK downward trend  -Swing bed approved and will be discharged in stable condition to transition to rehab     Acute encephalopathy (resolved)  -Chest x-ray nonacute  -UA is negative  -No leukocytosis, afebrile, HDS  -No indication for infection  -MRI of brain negative for acute stroke  -EEG unremarkable  -Appreciate neurology input, follow-up outpatient for ambulatory EEG, sign off     Acute respiratory failure with hypoxia  -Resolved   -on 2 L NC in admission, now weaned to room air  -Possible due to NATHANAEL because patient was somnolent  -Continue bronchodilators/pulmonary hygiene     DM-2 with Peripheral Neuropathy   -HOLD oral diabetes meds, cont Lyrica 150 mg bid   -POCT Glucose Qac&hs, Med Dose ISS, cont long acting insulin qhs (Lantus 30, home dose is Tresiba 45 u)     Hypertension  -Cont torsemide 15 mg      Hyperlipidemia, CAD and hx of CABG  -Cont Lipitor 20 mg and ASA 81 mg      Hypothyroidism  -Cont levothyroxine 100 mcg   Last TSH 4.32 Free T4: 0.9

## 2025-02-09 NOTE — CARE COORDINATION
[employer] initiated and APPROVED via [employer] portal. Auth ID [id number]  . Auth good until 2/14

## 2025-02-09 NOTE — CARE COORDINATION
PS to Hai Barrios re; receipt of precert for swing bed and that swing is able to accept pt today.

## 2025-02-10 PROBLEM — R53.81 PHYSICAL DEBILITY: Status: ACTIVE | Noted: 2025-02-10

## 2025-02-10 LAB
GLUCOSE BLD-MCNC: 126 MG/DL (ref 74–99)
GLUCOSE BLD-MCNC: 183 MG/DL (ref 74–99)
GLUCOSE BLD-MCNC: 192 MG/DL (ref 74–99)
GLUCOSE BLD-MCNC: 205 MG/DL (ref 74–99)
MICROORGANISM SPEC CULT: NORMAL
MICROORGANISM SPEC CULT: NORMAL
SERVICE CMNT-IMP: NORMAL
SERVICE CMNT-IMP: NORMAL
SPECIMEN DESCRIPTION: NORMAL
SPECIMEN DESCRIPTION: NORMAL

## 2025-02-10 PROCEDURE — 6370000000 HC RX 637 (ALT 250 FOR IP): Performed by: NURSE PRACTITIONER

## 2025-02-10 PROCEDURE — 1200000002 HC SEMI PRIVATE SWING BED

## 2025-02-10 PROCEDURE — 82962 GLUCOSE BLOOD TEST: CPT

## 2025-02-10 PROCEDURE — 94761 N-INVAS EAR/PLS OXIMETRY MLT: CPT

## 2025-02-10 PROCEDURE — 97116 GAIT TRAINING THERAPY: CPT

## 2025-02-10 PROCEDURE — 97161 PT EVAL LOW COMPLEX 20 MIN: CPT

## 2025-02-10 PROCEDURE — 6360000002 HC RX W HCPCS: Performed by: NURSE PRACTITIONER

## 2025-02-10 PROCEDURE — 97166 OT EVAL MOD COMPLEX 45 MIN: CPT

## 2025-02-10 RX ORDER — ASPIRIN 81 MG/1
81 TABLET ORAL DAILY
Status: DISCONTINUED | OUTPATIENT
Start: 2025-02-10 | End: 2025-02-14 | Stop reason: HOSPADM

## 2025-02-10 RX ORDER — INSULIN LISPRO 100 [IU]/ML
0-8 INJECTION, SOLUTION INTRAVENOUS; SUBCUTANEOUS ONCE
Status: COMPLETED | OUTPATIENT
Start: 2025-02-10 | End: 2025-02-10

## 2025-02-10 RX ADMIN — CHOLECALCIFEROL TAB 25 MCG (1000 UNIT) 1000 UNITS: 25 TAB at 09:07

## 2025-02-10 RX ADMIN — LEVOTHYROXINE SODIUM 100 MCG: 0.1 TABLET ORAL at 06:36

## 2025-02-10 RX ADMIN — TORSEMIDE 15 MG: 10 TABLET ORAL at 09:07

## 2025-02-10 RX ADMIN — INSULIN GLARGINE 30 UNITS: 100 INJECTION, SOLUTION SUBCUTANEOUS at 21:11

## 2025-02-10 RX ADMIN — HYDROCODONE BITARTRATE AND ACETAMINOPHEN 1 TABLET: 5; 325 TABLET ORAL at 11:37

## 2025-02-10 RX ADMIN — PREGABALIN 150 MG: 75 CAPSULE ORAL at 21:11

## 2025-02-10 RX ADMIN — FERROUS SULFATE TAB 325 MG (65 MG ELEMENTAL FE) 325 MG: 325 (65 FE) TAB at 09:08

## 2025-02-10 RX ADMIN — INSULIN LISPRO 2 UNITS: 100 INJECTION, SOLUTION INTRAVENOUS; SUBCUTANEOUS at 17:24

## 2025-02-10 RX ADMIN — ENOXAPARIN SODIUM 30 MG: 100 INJECTION SUBCUTANEOUS at 09:08

## 2025-02-10 RX ADMIN — ASPIRIN 81 MG: 81 TABLET, COATED ORAL at 09:07

## 2025-02-10 RX ADMIN — TAMSULOSIN HYDROCHLORIDE 0.4 MG: 0.4 CAPSULE ORAL at 21:11

## 2025-02-10 RX ADMIN — HYDROCODONE BITARTRATE AND ACETAMINOPHEN 1 TABLET: 5; 325 TABLET ORAL at 21:10

## 2025-02-10 RX ADMIN — INSULIN LISPRO 2 UNITS: 100 INJECTION, SOLUTION INTRAVENOUS; SUBCUTANEOUS at 21:10

## 2025-02-10 RX ADMIN — PREGABALIN 150 MG: 75 CAPSULE ORAL at 09:07

## 2025-02-10 RX ADMIN — ENOXAPARIN SODIUM 30 MG: 100 INJECTION SUBCUTANEOUS at 21:11

## 2025-02-10 RX ADMIN — INSULIN LISPRO 2 UNITS: 100 INJECTION, SOLUTION INTRAVENOUS; SUBCUTANEOUS at 13:08

## 2025-02-10 ASSESSMENT — PAIN SCALES - GENERAL
PAINLEVEL_OUTOF10: 7
PAINLEVEL_OUTOF10: 7
PAINLEVEL_OUTOF10: 2
PAINLEVEL_OUTOF10: 3

## 2025-02-10 ASSESSMENT — PAIN DESCRIPTION - DESCRIPTORS
DESCRIPTORS: ACHING;DISCOMFORT
DESCRIPTORS: ACHING;DISCOMFORT

## 2025-02-10 ASSESSMENT — PAIN DESCRIPTION - LOCATION
LOCATION: BACK
LOCATION: BACK

## 2025-02-10 ASSESSMENT — PAIN DESCRIPTION - ORIENTATION
ORIENTATION: LOWER
ORIENTATION: LOWER

## 2025-02-10 ASSESSMENT — ENCOUNTER SYMPTOMS
SORE THROAT: 0
RHINORRHEA: 0
SHORTNESS OF BREATH: 0
ABDOMINAL PAIN: 0
VOMITING: 0
WHEEZING: 0
DIARRHEA: 0
COUGH: 0
NAUSEA: 0

## 2025-02-10 ASSESSMENT — PAIN DESCRIPTION - ONSET
ONSET: ON-GOING
ONSET: GRADUAL

## 2025-02-10 ASSESSMENT — PAIN DESCRIPTION - PAIN TYPE
TYPE: CHRONIC PAIN
TYPE: CHRONIC PAIN

## 2025-02-10 ASSESSMENT — PAIN DESCRIPTION - FREQUENCY
FREQUENCY: INTERMITTENT
FREQUENCY: INTERMITTENT

## 2025-02-10 NOTE — PROGRESS NOTES
This RN has reviewed and agrees with Carito ROMERO LPN's data collection and has collaborated with this LPN regarding the patient's care plan.

## 2025-02-10 NOTE — PROGRESS NOTES
This RN has reviewed and agrees with Carito VIGIL'sDIMPLE's data collection and has collaborated with this LPN regarding the patient's care plan.

## 2025-02-10 NOTE — CARE COORDINATION
02/10/25 0647   Service Assessment   Patient Orientation Alert and Oriented   Cognition Alert   History Provided By Patient   Primary Caregiver Spouse   Support Systems Spouse/Significant Other   Patient's Healthcare Decision Maker is: Patient Declined (Legal Next of Kin Remains as Decision Maker)   PCP Verified by CM Yes   Prior Functional Level Assistance with the following:;Mobility   Current Functional Level Assistance with the following:;Mobility   Can patient return to prior living arrangement Yes   Ability to make needs known: Good   Family able to assist with home care needs: Yes   Would you like for me to discuss the discharge plan with any other family members/significant others, and if so, who? Yes  (Wife)   Financial Resources Medicare; ([place])   Community Resources None   Social/Functional History   Lives With Spouse   Type of Home House   Home Layout One level   Home Access Level entry   Home Equipment Walker - Rolling;Cane   Receives Help From Family   Active  Yes   Discharge Planning   Type of Residence House   Living Arrangements Spouse/Significant Other   Current Services Prior To Admission None   Potential Assistance Needed Outpatient PT/OT;Home Care   Patient expects to be discharged to: House   History of falls? 1   Services At/After Discharge   Services At/After Discharge Home Health;Outpatient;PT   Mode of Transport at Discharge Other (see comment)  (Wife)   Confirm Follow Up Transport Family   Condition of Participation: Discharge Planning   The Plan for Transition of Care is related to the following treatment goals: Plans to return home   The Patient and/or Patient Representative was provided with a Choice of Provider? Patient   The Patient and/Or Patient Representative agree with the Discharge Plan? Yes   Freedom of Choice list was provided with basic dialogue that supports the patient's individualized plan of care/goals, treatment preferences, and shares the quality data

## 2025-02-10 NOTE — PROGRESS NOTES
V2.0  History and Physical      Name:  Pino West /Age/Sex: 1955  (69 y.o. male)   MRN & CSN:  [id number] & [id number] Encounter Date/Time: 2/10/2025 8:56 AM EST   Location:   PCP: Hai Yadav MD       Hospital Day: 2    Assessment and Plan:   Pino West is a 69 y.o. male who presents with Physical debility    Physical debility-patient to receive PT/OT while in swing bed program  Elevated LFTs-monitor LFTs to ensure downward trend.  DM2 with peripheral neuropathy-on Lantus and sliding scale insulin  CAD, history of CABG-on aspirin  Hypothyroidism-on Synthroid  BPH-on Flomax  NATHANAEL  Vitamin D deficiency-on vitamin D      Comment: Please note this report has been produced using speech recognition software and may contain errors related to that system including errors in grammar, punctuation, and spelling, as well as words and phrases that may be inappropriate. If there are any questions or concerns please feel free to contact the dictating provider for clarification.      Total Encounter Time: 39 minutes  Home Meds Documented: [x] Yes [] Needs Reconciled    Disposition:   Current Living situation: Home  Expected Disposition: TBD  Estimated D/C: 3/3    Diet ADULT DIET; Regular; 4 carb choices (60 gm/meal); No Added Salt (3-4 gm)   DVT Prophylaxis [x] Lovenox, []  Heparin, [] SCDs, [] Ambulation,  [] Eliquis, [] Xarelto   Code Status  Had discussion with patient about CPR, shocking, and intubation. Patient is a Full Code.    Surrogate Decision Maker/ POA Lanny West     Personally reviewed Lab Studies and Imaging       History from:     patient, electronic medical record    History of Present Illness:     Chief Complaint: Physical debility  Pino West is a 69 y.o. male with pmh of CAD, CABG, NATHANAEL, DM, who presents with physical debility.    Patient was admitted at [facility] from -2025.  Patient was treated for being found down and having encephalopathy which resolved.  He was  Paramjit Gustafson MD 2/5/2025 15:55        CT CERVICAL SPINE WO CONTRAST    Result Date: 2/4/2025  PROCEDURE: CT CERVICAL SPINE WO CONTRAST DATE OF EXAM:  2/4/2025 23:12 DEMOGRAPHICS: 69 years old Male INDICATION: fall COMPARISON: No existing relevant imaging study corresponding to the same anatomical region is available. TECHNIQUE: Contiguous axial slices of the cervical spine were submitted without IV administration of contrast. Additional coronal and sagittal reformatted images were submitted.    DOSE OPTIMIZATION: CT radiation dose optimization techniques (automated exposure  control, and use of iterative reconstruction techniques, or adjustment of the mA and/or kV according to patient size) were used to limit patient radiation dose. FINDINGS: CT CERVICAL SPINE: Bones: Detailed below C4 is limited significantly due to artifact. Degenerative changes are noted throughout the cervical spine. Facets overlap bilaterally at every level. There is no acute displaced fracture. Soft tissues: Prevertebral soft tissue thickness is normal. There is no mass or hematoma. Spinal canal: The canal detail is limited due to artifact. Multilevel disc bulging is suggested. There is no large disc herniation IMPRESSION: 1.  . Degenerative changes 2.  No acute fracture. This dictation was created with voice recognition software.  While attempts have  been made to review the dictation as it is transcribed, on occasion the spoken word can be misinterpreted by the technology leading to omissions or inappropriate words, phrases or sentences.  Dictated and Electronically Signed By: Bebo Rodgers MD 2/4/2025 22:41        CT HEAD WO CONTRAST    Result Date: 2/4/2025  PROCEDURE: CT HEAD WO CONTRAST DATE OF EXAM:  2/4/2025 23:12 DEMOGRAPHICS: 69 years old Male INDICATION: fall/ams COMPARISON: No existing relevant imaging study corresponding to the same anatomical region is available. TECHNIQUE: Standard axial and coronal CT images of the brain were

## 2025-02-10 NOTE — PROGRESS NOTES
4 Eyes Skin Assessment     NAME:  Pino West  YOB: 1955  MEDICAL RECORD NUMBER:  [id number]    The patient is being assessed for  Admission    I agree that at least one RN has performed a thorough Head to Toe Skin Assessment on the patient. ALL assessment sites listed below have been assessed.      Areas assessed by both nurses:    Head, Face, Ears, Shoulders, Back, Chest, Arms, Elbows, Hands, Sacrum. Buttock, Coccyx, Ischium, Legs. Feet and Heels, and Under Medical Devices         Does the Patient have a Wound? No noted wound(s)       Wiley Prevention initiated by RN: No  Wound Care Orders initiated by RN: No    Pressure Injury (Stage 3,4, Unstageable, DTI, NWPT, and Complex wounds) if present, place Wound referral order by RN under : No    New Ostomies, if present place, Ostomy referral order under : No     Nurse 1 eSignature: Electronically signed by Carito Moya LPN on 2/9/25 at 8:51 PM EST    **SHARE this note so that the co-signing nurse can place an eSignature**    Nurse 2 eSignature: Electronically signed by Divya Avalos RN on 2/9/25 at 8:55 PM EST

## 2025-02-10 NOTE — PROGRESS NOTES
Physical Therapy  Facility/Department: [facility]  Physical Therapy Initial Assessment    Name: Pino West  : 1955  MRN: [id number]  Date of Service: 2/10/2025    Discharge Recommendations:  Home with assist PRN          Patient Diagnosis(es):   Past Medical History:  has a past medical history of Adenomatous polyp of colon, Bulging discs, CABG (coronary artery bypass graft), CAD (coronary artery disease), Diabetes mellitus (HCC), H/O cardiovascular stress test, Hyperlipidemia, Hypotestosteronism, Lumbar facet arthropathy, Nausea & vomiting, Obstructive sleep apnea on CPAP, Prostate hypertrophy, Proteinuria, Screening colonoscopy, and Smoking hx.  Past Surgical History:  has a past surgical history that includes Coronary artery bypass graft (2-); Cholecystectomy; Umbilical hernia repair; Anterior cruciate ligament repair; hernia repair; Tonsillectomy; Cardiac surgery; Pilonidal cyst excision; and Colonoscopy.    Assessment  Patient is a 69 y.o. male who presents to [facility] with physical debility. Patient would benefit from continued skilled physical therapy services to address decreased strength, ROM, and endurance, impaired balance, and decline in functional mobility  Body Structures, Functions, Activity Limitations Requiring Skilled Therapeutic Intervention: Decreased functional mobility ;Decreased endurance  Treatment Diagnosis: physical debility  Therapy Prognosis: Good  Decision Making: Low Complexity  History: PF - none  Exam: strength/transfers  Clinical Presentation: evolving  Barriers to Learning: none  Requires PT Follow-Up: Yes    Plan  Physical Therapy Plan  General Plan:  (4+)  Current Treatment Recommendations: Transfer training, Functional mobility training, Endurance training  Safety Devices  Type of Devices: Call light within reach, Chair alarm in place, Gait belt, Left in chair    Restrictions  Restrictions/Precautions  Restrictions/Precautions: Fall Risk

## 2025-02-10 NOTE — PROGRESS NOTES
education & training, Endurance training, Equipment evaluation, education, & procurement, Positioning, Cognitive/Perceptual training, Gait training    Restrictions  Restrictions/Precautions  Restrictions/Precautions: Fall Risk    Subjective  General  Chart Reviewed: Yes  Patient assessed for rehabilitation services?: Yes  Family / Caregiver Present: No  Subjective  Subjective: Patient supine in bed, HOB elevated upon arrival.     Social/Functional History  Social/Functional History  Lives With: Spouse  Type of Home: House  Home Layout: One level  Home Access: Level entry  Bathroom Shower/Tub: Walk-in shower  Bathroom Toilet: Standard  Bathroom Equipment: Shower chair  Bathroom Accessibility: Accessible  Home Equipment: Cane, Rollator  Has the patient had two or more falls in the past year or any fall with injury in the past year?: Yes  Receives Help From: Family  Prior Level of Assist for ADLs: Independent  Prior Level of Assist for Homemaking: Independent  Homemaking Responsibilities: Yes  Meal Prep Responsibility: Primary  Laundry Responsibility: Primary  Cleaning Responsibility: Primary  Shopping Responsibility: Primary  Prior Level of Assist for Ambulation: Independent household ambulator, with or without device  Prior Level of Assist for Transfers: Independent  Active : Yes  Occupation: Retired  Additional Comments: Patient takes care of wife currently. Sleeps in a flat bed    Objective  Temp: 98.1 °F (36.7 °C)  Pulse: 77  Heart Rate Source: Monitor  Respirations: 16  SpO2: 93 %  O2 Device: None (Room air)  BP: 127/69  MAP (Calculated): 88  Vision  Vision: Impaired  Vision Exceptions: Wears glasses at all times  Hearing  Hearing: Within functional limits      Observation/Palpation  Observation: in bed  Safety Devices  Type of Devices: All fall risk precautions in place;Call light within reach;Chair alarm in place;Gait belt;Nurse notified;Left in chair;Patient at risk for falls  Restraints  Restraints

## 2025-02-10 NOTE — PLAN OF CARE
Problem: Chronic Conditions and Co-morbidities  Goal: Patient's chronic conditions and co-morbidity symptoms are monitored and maintained or improved  2/10/2025 1500 by Telma Stoddard, RN  Outcome: Progressing  2/10/2025 0140 by Stacie Segovia, RN  Outcome: Progressing     Problem: Safety - Adult  Goal: Free from fall injury  Outcome: Progressing     Problem: ABCDS Injury Assessment  Goal: Absence of physical injury  Outcome: Progressing     Problem: Pain  Goal: Verbalizes/displays adequate comfort level or baseline comfort level  Outcome: Progressing     Problem: Discharge Planning  Goal: Discharge to home or other facility with appropriate resources  Outcome: Progressing     Problem: Musculoskeletal - Adult  Goal: Return mobility to safest level of function  Outcome: Progressing  Goal: Maintain proper alignment of affected body part  Outcome: Progressing  Goal: Return ADL status to a safe level of function  Outcome: Progressing

## 2025-02-11 LAB
GLUCOSE BLD-MCNC: 136 MG/DL (ref 74–99)
GLUCOSE BLD-MCNC: 214 MG/DL (ref 74–99)
GLUCOSE BLD-MCNC: 220 MG/DL (ref 74–99)
GLUCOSE BLD-MCNC: 229 MG/DL (ref 74–99)

## 2025-02-11 PROCEDURE — 97530 THERAPEUTIC ACTIVITIES: CPT

## 2025-02-11 PROCEDURE — 6360000002 HC RX W HCPCS: Performed by: NURSE PRACTITIONER

## 2025-02-11 PROCEDURE — 6370000000 HC RX 637 (ALT 250 FOR IP): Performed by: NURSE PRACTITIONER

## 2025-02-11 PROCEDURE — 97110 THERAPEUTIC EXERCISES: CPT

## 2025-02-11 PROCEDURE — 94761 N-INVAS EAR/PLS OXIMETRY MLT: CPT

## 2025-02-11 PROCEDURE — 97116 GAIT TRAINING THERAPY: CPT

## 2025-02-11 PROCEDURE — 1200000002 HC SEMI PRIVATE SWING BED

## 2025-02-11 PROCEDURE — 82962 GLUCOSE BLOOD TEST: CPT

## 2025-02-11 PROCEDURE — 6370000000 HC RX 637 (ALT 250 FOR IP): Performed by: FAMILY MEDICINE

## 2025-02-11 RX ORDER — INSULIN LISPRO 100 [IU]/ML
5 INJECTION, SOLUTION INTRAVENOUS; SUBCUTANEOUS
Status: DISCONTINUED | OUTPATIENT
Start: 2025-02-11 | End: 2025-02-14 | Stop reason: HOSPADM

## 2025-02-11 RX ADMIN — HYDROCODONE BITARTRATE AND ACETAMINOPHEN 1 TABLET: 5; 325 TABLET ORAL at 09:48

## 2025-02-11 RX ADMIN — PREGABALIN 150 MG: 75 CAPSULE ORAL at 20:48

## 2025-02-11 RX ADMIN — INSULIN LISPRO 5 UNITS: 100 INJECTION, SOLUTION INTRAVENOUS; SUBCUTANEOUS at 17:51

## 2025-02-11 RX ADMIN — TAMSULOSIN HYDROCHLORIDE 0.4 MG: 0.4 CAPSULE ORAL at 20:49

## 2025-02-11 RX ADMIN — ENOXAPARIN SODIUM 30 MG: 100 INJECTION SUBCUTANEOUS at 20:47

## 2025-02-11 RX ADMIN — INSULIN GLARGINE 30 UNITS: 100 INJECTION, SOLUTION SUBCUTANEOUS at 20:48

## 2025-02-11 RX ADMIN — HYDROCODONE BITARTRATE AND ACETAMINOPHEN 1 TABLET: 5; 325 TABLET ORAL at 20:54

## 2025-02-11 RX ADMIN — LEVOTHYROXINE SODIUM 100 MCG: 0.1 TABLET ORAL at 09:52

## 2025-02-11 RX ADMIN — CHOLECALCIFEROL TAB 25 MCG (1000 UNIT) 1000 UNITS: 25 TAB at 09:47

## 2025-02-11 RX ADMIN — INSULIN LISPRO 2 UNITS: 100 INJECTION, SOLUTION INTRAVENOUS; SUBCUTANEOUS at 17:51

## 2025-02-11 RX ADMIN — INSULIN LISPRO 5 UNITS: 100 INJECTION, SOLUTION INTRAVENOUS; SUBCUTANEOUS at 12:55

## 2025-02-11 RX ADMIN — PREGABALIN 150 MG: 75 CAPSULE ORAL at 09:47

## 2025-02-11 RX ADMIN — INSULIN LISPRO 2 UNITS: 100 INJECTION, SOLUTION INTRAVENOUS; SUBCUTANEOUS at 12:55

## 2025-02-11 RX ADMIN — FERROUS SULFATE TAB 325 MG (65 MG ELEMENTAL FE) 325 MG: 325 (65 FE) TAB at 09:47

## 2025-02-11 RX ADMIN — INSULIN LISPRO 5 UNITS: 100 INJECTION, SOLUTION INTRAVENOUS; SUBCUTANEOUS at 09:48

## 2025-02-11 RX ADMIN — TORSEMIDE 15 MG: 10 TABLET ORAL at 09:47

## 2025-02-11 RX ADMIN — INSULIN LISPRO 2 UNITS: 100 INJECTION, SOLUTION INTRAVENOUS; SUBCUTANEOUS at 20:47

## 2025-02-11 RX ADMIN — ASPIRIN 81 MG: 81 TABLET, COATED ORAL at 09:47

## 2025-02-11 RX ADMIN — ENOXAPARIN SODIUM 30 MG: 100 INJECTION SUBCUTANEOUS at 09:48

## 2025-02-11 ASSESSMENT — PAIN DESCRIPTION - ONSET
ONSET: GRADUAL
ONSET: GRADUAL

## 2025-02-11 ASSESSMENT — PAIN DESCRIPTION - LOCATION
LOCATION: BACK
LOCATION: BACK

## 2025-02-11 ASSESSMENT — PAIN DESCRIPTION - DESCRIPTORS
DESCRIPTORS: ACHING
DESCRIPTORS: ACHING;DISCOMFORT

## 2025-02-11 ASSESSMENT — PAIN DESCRIPTION - PAIN TYPE
TYPE: CHRONIC PAIN
TYPE: CHRONIC PAIN

## 2025-02-11 ASSESSMENT — PAIN DESCRIPTION - ORIENTATION
ORIENTATION: LOWER
ORIENTATION: MID;LOWER

## 2025-02-11 ASSESSMENT — PAIN DESCRIPTION - FREQUENCY
FREQUENCY: INTERMITTENT
FREQUENCY: INTERMITTENT

## 2025-02-11 ASSESSMENT — PAIN SCALES - GENERAL
PAINLEVEL_OUTOF10: 7
PAINLEVEL_OUTOF10: 2
PAINLEVEL_OUTOF10: 2
PAINLEVEL_OUTOF10: 3
PAINLEVEL_OUTOF10: 7

## 2025-02-11 NOTE — H&P
outperforming CT for detection of radiographically occult hip fractures.  Therefore, if there are continued symptoms, the patient is unable to  bear weight, or the clinical suspicion persists, an MRI is recommended, and can  be specifically tailored to exclude fracture using sequences totalling less than 10 minutes. [Reference: Gonzalez RJ, Gerard BN, Erin KOTHARI, et. al. ACR Appropriateness Criteria: Acute Hip Pain?Suspected Fracture. 2013.]  Dictated and Electronically Signed By: Shilo Luciano MD 2/4/2025 22:03        XR CHEST PORTABLE    Result Date: 2/4/2025  PROCEDURE: XR CHEST PORTABLE DATE OF EXAM:  2/4/2025 22:54 DEMOGRAPHICS: 69 years old Male INDICATION: fall/ams COMPARISON: None FINDINGS:  Median sternotomy wires. Diminished inflation. No pleural effusion, pneumothorax, or focal consolidation. Cardiac silhouette is upper limits of normal. No acute osseous abnormality. IMPRESSION:  No focal consolidation or overt edema.  Dictated and Electronically Signed By: Shilo Luciano MD 2/4/2025 22:00              Electronically signed by Cami Blankenship MD on 2/10/2025 at 8:56 AM

## 2025-02-11 NOTE — PROGRESS NOTES
Occupational Therapy  [x] daily progress note   [] discharge     Date:  2025 Room:  [address] [place] [address]    Date of Admission: 2025  Patient Name:  Pino West   :  1955 MRN: [id number]    Primary Problem    Patient Active Problem List   Diagnosis    Hyperlipidemia    ASHD (arteriosclerotic heart disease)    Lumbar herniated disc    Radiculopathy of leg    Peripheral neuropathy    H/O cardiovascular stress test    Controlled substance agreement signed    Type 2 diabetes mellitus with complication, with long-term current use of insulin (HCC)    AMS (altered mental status)    Syncope    Physical debility     Rehabilitation Diagnosis:     Physical debility [R53.81]   Physical debility     Restrictions/Precautions:Restrictions/Precautions  Restrictions/Precautions: Fall Risk    Social History  Social/Functional History  Lives With: Spouse  Type of Home: House  Home Layout: One level  Home Access: Level entry  Bathroom Shower/Tub: Walk-in shower  Bathroom Toilet: Standard  Bathroom Equipment: Shower chair  Bathroom Accessibility: Accessible  Home Equipment: Cane, Rollator  Has the patient had two or more falls in the past year or any fall with injury in the past year?: Yes  Receives Help From: Family  Prior Level of Assist for ADLs: Independent  Prior Level of Assist for Homemaking: Independent  Homemaking Responsibilities: Yes  Meal Prep Responsibility: Primary  Laundry Responsibility: Primary  Cleaning Responsibility: Primary  Shopping Responsibility: Primary  Prior Level of Assist for Ambulation: Independent household ambulator, with or without device  Prior Level of Assist for Transfers: Independent  Active : Yes  Occupation: Retired  Additional Comments: Patient takes care of wife currently. Sleeps in a flat bed      Subjective                                                                           Patient states:  Pt agreeable for therapy  Pain: Location & Type:     Intensity (0-10/10):  no

## 2025-02-11 NOTE — PROGRESS NOTES
Comprehensive Nutrition Assessment    Type and Reason for Visit:  Initial (Swing Bed)    Nutrition Recommendations/Plan:   Continue to provide carb control MARITO diet  Please document accurate po intakes and weights  Will monitor and follow weights, labs, po intakes and POC     Malnutrition Assessment:  Malnutrition Status:  No malnutrition (02/11/25 1438)    Context:  Acute Illness     Findings of the 6 clinical characteristics of malnutrition:  Energy Intake:  No decrease in energy intake  Weight Loss:  No weight loss     Body Fat Loss:  No body fat loss     Muscle Mass Loss:  No muscle mass loss    Fluid Accumulation:  Unable to assess     Strength:  Not Performed    Nutrition Assessment:    Pt admitted to swing bed for physical debility after acute IP admission for encephalopathy, respiratory failure with hypoxia, nontraumatic rhabdomyolysis. Hx includes CABG x 3, CAD, DM-recently diagnosed, HLD. Current diet order carb control-4 carbs/meal with MARITO. Intakes consistently 75% or greater. Pt was busy and unable to talk at length with RD but will follow up and offer diet education as well. At this time do not expect pt to be at acute nutrition risk. Will monitor and follow at low nutrition risk.    Nutrition Related Findings:    POCT Gluc: 136-205. Meds include Humalog medium corrective dose, humalog 5 units TID, Lantus 30 units at HS, Synthroid, Demadex. Wound Type: None       Current Nutrition Intake & Therapies:    Average Meal Intake: % (Noted 1 meal 25%)  Average Supplements Intake: None Ordered  ADULT DIET; Regular; 4 carb choices (60 gm/meal); No Added Salt (3-4 gm)    Anthropometric Measures:  Height: 172.7 cm (5' 8\")  Ideal Body Weight (IBW): 154 lbs (70 kg)    Admission Body Weight: 113.4 kg (250 lb)  Current Body Weight: 113.9 kg (251 lb), 163 % IBW. Weight Source: Bed scale  Current BMI (kg/m2): 38.2  Usual Body Weight: 113.4 kg (250 lb) (per pt)     % Weight Change (Calculated): 0.4  Weight

## 2025-02-11 NOTE — PLAN OF CARE
Problem: Chronic Conditions and Co-morbidities  Goal: Patient's chronic conditions and co-morbidity symptoms are monitored and maintained or improved  2/11/2025 0043 by Belinda Sandy RN  Outcome: Progressing  2/10/2025 1500 by Telma Stoddard RN  Outcome: Progressing     Problem: Safety - Adult  Goal: Free from fall injury  2/11/2025 0043 by Belinda Sandy RN  Outcome: Progressing  2/10/2025 1500 by Telma Stoddard RN  Outcome: Progressing     Problem: ABCDS Injury Assessment  Goal: Absence of physical injury  2/11/2025 0043 by Belinda Sandy RN  Outcome: Progressing  2/10/2025 1500 by Telma Stoddard RN  Outcome: Progressing     Problem: Pain  Goal: Verbalizes/displays adequate comfort level or baseline comfort level  2/11/2025 0043 by Belinda Sandy RN  Outcome: Progressing  2/10/2025 1500 by Telma Stoddard RN  Outcome: Progressing     Problem: Discharge Planning  Goal: Discharge to home or other facility with appropriate resources  2/11/2025 0043 by Belinda Sandy RN  Outcome: Progressing  2/10/2025 1500 by Telma Stoddard RN  Outcome: Progressing     Problem: Musculoskeletal - Adult  Goal: Return mobility to safest level of function  2/11/2025 0043 by Belinda Sandy RN  Outcome: Progressing  2/10/2025 1500 by Telma Stoddard RN  Outcome: Progressing  Goal: Maintain proper alignment of affected body part  2/11/2025 0043 by Belinda Sandy RN  Outcome: Progressing  2/10/2025 1500 by Telma Stoddard RN  Outcome: Progressing  Goal: Return ADL status to a safe level of function  2/11/2025 0043 by Belinda Sandy RN  Outcome: Progressing  2/10/2025 1500 by Telma Stoddard RN  Outcome: Progressing

## 2025-02-11 NOTE — PLAN OF CARE
Patient cooperating with staff and therapy to return patient mobility and ADL status to safest level of functioning.  Problem: Musculoskeletal - Adult  Goal: Return mobility to safest level of function  Outcome: Progressing     Problem: Musculoskeletal - Adult  Goal: Return ADL status to a safe level of function  Outcome: Progressing

## 2025-02-11 NOTE — PROGRESS NOTES
This RN has reviewed and agrees with Carito Meier LPN's data collection and has collaborated with this LPN regarding the patient's care plan.

## 2025-02-12 LAB
GLUCOSE BLD-MCNC: 156 MG/DL (ref 74–99)
GLUCOSE BLD-MCNC: 170 MG/DL (ref 74–99)
GLUCOSE BLD-MCNC: 199 MG/DL (ref 74–99)
GLUCOSE BLD-MCNC: 206 MG/DL (ref 74–99)

## 2025-02-12 PROCEDURE — 6370000000 HC RX 637 (ALT 250 FOR IP): Performed by: FAMILY MEDICINE

## 2025-02-12 PROCEDURE — 97530 THERAPEUTIC ACTIVITIES: CPT

## 2025-02-12 PROCEDURE — 1200000002 HC SEMI PRIVATE SWING BED

## 2025-02-12 PROCEDURE — 6370000000 HC RX 637 (ALT 250 FOR IP): Performed by: NURSE PRACTITIONER

## 2025-02-12 PROCEDURE — 94761 N-INVAS EAR/PLS OXIMETRY MLT: CPT

## 2025-02-12 PROCEDURE — 97535 SELF CARE MNGMENT TRAINING: CPT

## 2025-02-12 PROCEDURE — 82962 GLUCOSE BLOOD TEST: CPT

## 2025-02-12 PROCEDURE — 6360000002 HC RX W HCPCS: Performed by: NURSE PRACTITIONER

## 2025-02-12 RX ADMIN — INSULIN LISPRO 2 UNITS: 100 INJECTION, SOLUTION INTRAVENOUS; SUBCUTANEOUS at 13:03

## 2025-02-12 RX ADMIN — TORSEMIDE 15 MG: 10 TABLET ORAL at 08:45

## 2025-02-12 RX ADMIN — TAMSULOSIN HYDROCHLORIDE 0.4 MG: 0.4 CAPSULE ORAL at 20:52

## 2025-02-12 RX ADMIN — LEVOTHYROXINE SODIUM 100 MCG: 0.1 TABLET ORAL at 08:45

## 2025-02-12 RX ADMIN — ASPIRIN 81 MG: 81 TABLET, COATED ORAL at 08:45

## 2025-02-12 RX ADMIN — PREGABALIN 150 MG: 75 CAPSULE ORAL at 20:52

## 2025-02-12 RX ADMIN — INSULIN LISPRO 5 UNITS: 100 INJECTION, SOLUTION INTRAVENOUS; SUBCUTANEOUS at 17:58

## 2025-02-12 RX ADMIN — CHOLECALCIFEROL TAB 25 MCG (1000 UNIT) 1000 UNITS: 25 TAB at 08:45

## 2025-02-12 RX ADMIN — ENOXAPARIN SODIUM 30 MG: 100 INJECTION SUBCUTANEOUS at 20:52

## 2025-02-12 RX ADMIN — INSULIN GLARGINE 30 UNITS: 100 INJECTION, SOLUTION SUBCUTANEOUS at 20:57

## 2025-02-12 RX ADMIN — FERROUS SULFATE TAB 325 MG (65 MG ELEMENTAL FE) 325 MG: 325 (65 FE) TAB at 08:45

## 2025-02-12 RX ADMIN — HYDROCODONE BITARTRATE AND ACETAMINOPHEN 1 TABLET: 5; 325 TABLET ORAL at 20:52

## 2025-02-12 RX ADMIN — INSULIN LISPRO 2 UNITS: 100 INJECTION, SOLUTION INTRAVENOUS; SUBCUTANEOUS at 17:57

## 2025-02-12 RX ADMIN — ENOXAPARIN SODIUM 30 MG: 100 INJECTION SUBCUTANEOUS at 08:45

## 2025-02-12 RX ADMIN — INSULIN LISPRO 5 UNITS: 100 INJECTION, SOLUTION INTRAVENOUS; SUBCUTANEOUS at 13:03

## 2025-02-12 RX ADMIN — PREGABALIN 150 MG: 75 CAPSULE ORAL at 08:45

## 2025-02-12 RX ADMIN — INSULIN LISPRO 5 UNITS: 100 INJECTION, SOLUTION INTRAVENOUS; SUBCUTANEOUS at 08:45

## 2025-02-12 ASSESSMENT — PAIN DESCRIPTION - FREQUENCY: FREQUENCY: INTERMITTENT

## 2025-02-12 ASSESSMENT — PAIN DESCRIPTION - LOCATION: LOCATION: BACK

## 2025-02-12 ASSESSMENT — PAIN DESCRIPTION - DESCRIPTORS: DESCRIPTORS: ACHING

## 2025-02-12 ASSESSMENT — PAIN DESCRIPTION - PAIN TYPE: TYPE: CHRONIC PAIN

## 2025-02-12 ASSESSMENT — PAIN DESCRIPTION - ORIENTATION: ORIENTATION: LOWER

## 2025-02-12 ASSESSMENT — PAIN DESCRIPTION - ONSET: ONSET: GRADUAL

## 2025-02-12 ASSESSMENT — PAIN SCALES - GENERAL
PAINLEVEL_OUTOF10: 0
PAINLEVEL_OUTOF10: 7

## 2025-02-12 ASSESSMENT — PAIN - FUNCTIONAL ASSESSMENT: PAIN_FUNCTIONAL_ASSESSMENT: ACTIVITIES ARE NOT PREVENTED

## 2025-02-12 NOTE — CARE COORDINATION
SWING BED WEEKLY TEAM SHEET     Pino West   2/12/2025 WEEK # 1    Care Management    Issues to be resolved before discharge: Increased strength/balance/mobility     Family Education: Patient/staff to update family     Discharge Plan: Home   Patient/Family Adjustment     Goals of previous week:   [] 1st team   [] met   [] partially met    [x] not met             Why goals were not met: Increased weakness     Skilled Level of Care Remains   [x] yes   [] no    Estimated length of stay: Insurance review due 2/13/25  Patient/Family Concerns/input: None at this time     Patient/Family  Signature _________________  2/12/2025       Care Management Signature:  Matt Roth, RN

## 2025-02-12 NOTE — PLAN OF CARE
Problem: Chronic Conditions and Co-morbidities  Goal: Patient's chronic conditions and co-morbidity symptoms are monitored and maintained or improved  2/11/2025 2255 by Jeimy Bailey RN  Outcome: Progressing  2/11/2025 1451 by Susie Roth RN  Outcome: Progressing     Problem: Safety - Adult  Goal: Free from fall injury  2/11/2025 2255 by Jeimy Bailey RN  Outcome: Progressing  2/11/2025 1451 by Susie Roth RN  Outcome: Progressing     Problem: ABCDS Injury Assessment  Goal: Absence of physical injury  2/11/2025 2255 by Jeimy Bailey RN  Outcome: Progressing  2/11/2025 1451 by Susie Roth RN  Outcome: Progressing     Problem: Pain  Goal: Verbalizes/displays adequate comfort level or baseline comfort level  2/11/2025 2255 by Jeimy Bailey RN  Outcome: Progressing  2/11/2025 1451 by Susie Roth RN  Outcome: Progressing     Problem: Discharge Planning  Goal: Discharge to home or other facility with appropriate resources  2/11/2025 2255 by Jeimy Bailey RN  Outcome: Progressing  2/11/2025 1451 by Susie Roth RN  Outcome: Progressing     Problem: Musculoskeletal - Adult  Goal: Return mobility to safest level of function  2/11/2025 2255 by Jeimy Bailey RN  Outcome: Progressing  2/11/2025 1451 by Susie Roth RN  Outcome: Progressing  Goal: Maintain proper alignment of affected body part  2/11/2025 2255 by Jeimy Bailey RN  Outcome: Progressing  2/11/2025 1451 by Susie Roth RN  Outcome: Progressing  Goal: Return ADL status to a safe level of function  2/11/2025 2255 by Jeimy Bailey RN  Outcome: Progressing  2/11/2025 1451 by Susie Roth RN  Outcome: Progressing

## 2025-02-12 NOTE — PROGRESS NOTES
Occupational Therapy  [x] daily progress note   [] discharge     Date:  2025 Room:  [facility]-    Date of Admission: 2025  Patient Name:  Pino West   :  1955 MRN: [id number]    Primary Problem    Patient Active Problem List   Diagnosis    Hyperlipidemia    ASHD (arteriosclerotic heart disease)    Lumbar herniated disc    Radiculopathy of leg    Peripheral neuropathy    H/O cardiovascular stress test    Controlled substance agreement signed    Type 2 diabetes mellitus with complication, with long-term current use of insulin (HCC)    AMS (altered mental status)    Syncope    Physical debility     Rehabilitation Diagnosis:     Physical debility [R53.81]   Physical debility     Restrictions/Precautions:Restrictions/Precautions  Restrictions/Precautions: Fall Risk    Social History  Social/Functional History  Lives With: Spouse  Type of Home: House  Home Layout: One level  Home Access: Level entry  Bathroom Shower/Tub: Walk-in shower  Bathroom Toilet: Standard  Bathroom Equipment: Shower chair  Bathroom Accessibility: Accessible  Home Equipment: Cane, Rollator  Has the patient had two or more falls in the past year or any fall with injury in the past year?: Yes  Receives Help From: Family  Prior Level of Assist for ADLs: Independent  Prior Level of Assist for Homemaking: Independent  Homemaking Responsibilities: Yes  Meal Prep Responsibility: Primary  Laundry Responsibility: Primary  Cleaning Responsibility: Primary  Shopping Responsibility: Primary  Prior Level of Assist for Ambulation: Independent household ambulator, with or without device  Prior Level of Assist for Transfers: Independent  Active : Yes  Occupation: Retired  Additional Comments: Patient takes care of wife currently. Sleeps in a flat bed      Subjective                                                                           Patient states:  Pt agreeable for therapy  Pain: Location & Type:     Intensity (0-10/10):  no  pain    Objective                                                                              Observation:  pt alert and oriented  Objective Measures:    Communication with other providers:    [x] ok to see per nursing  [] other:       Current Status:     Date 02/11/25 02/12/25              Time In/Out   [id number] 1325-4849      Total Time 40 53      Charges 1 TA 2 TE 3 ADL 1 TA              Grooming x Sup to wash face and brush hair      UB Bathing x SUp to wash UB sitting on shower bench      LB Bathing x SUp to wash LE and leaned to side to wash buttocks.      UB Dressing x Sup to don pull over shirt      LB Dressing x Sup to don pants      Toileting x x              Supine to Sit x x      Sit to Supine x x      Sit to Stand CGA CGA to RW      Stand to Sit CGA CGA to RW      Toilet Trans x x      Shower Trans x SBA using handrail      Car Trans x x      Ambulation CGA x 75' and 50' with RW CGA x 50' x 2       Homemaking x x      Eat/Feeding x x      W/C Mgmt x x      Alarm in place when finished  []bed   [x]chair        []other:  []bed   [x]chair       []other:  []bed   []chair       []other:  []bed   []chair       []other: []bed   []chair       []other:         Patient/Caregiver Education and Training:       Today's Treatment Included:  [] Therapeutic Exercise          [x] Therapeutic Activity  Pt stood x 7 min with SBA with RW to facilitate increased endurance/strength for ADL tasks and transfers.       [] Neuromuscular Re-education     [] Gait           [] W/C Management  [] Instruction in HEP     [] Group   [] Manual  [] Homemaking   [] ADL   [] Other:     Adverse Reaction to Tx:    [x] None  [] Other:   Significant Change in Status:     [x] None  [] Other:    Assessment / Impression                                                          Treatment/Activity Tolerance:   [x] Tolerated Treatment well    [x] Patient limited by fatigue/pain      [] Patient limited by medical complications   [] Additional

## 2025-02-12 NOTE — PROGRESS NOTES
V2.0    [facility] [place]      Name:  Pino West /Age/Sex: 1955  (69 y.o. male)   MRN & CSN:  [id number] & [id number] Encounter Date/Time: 2025 11:29 AM EST   Location:   PCP: Hai Yadav MD     Attending:Paul Morris MD       Hospital Day: 4    Assessment and Recommendations   Pino West is a 69 y.o. male  who presents with Physical debility      Plan:   Physical debility, patient wore out early total of 450 feet yesterday with standing rest breaks.  Elevated LFTs-monitor LFTs to ensure downward trend.  DM2 with peripheral neuropathy-continue insulin sliding scale before meals and at bedtime monitor for hypoglycemia with serial Accu-Cheks.  Hypoglycemic protocol ordered diabetic diet.    CAD, history of CABG-on aspirin  Hypothyroidism, continue  Synthroid  BPH continue Flomax  NATHANAEL, not using CPAP  Vitamin D deficiency-on vitamin D    Diet ADULT DIET; Regular; 4 carb choices (60 gm/meal); No Added Salt (3-4 gm)   DVT Prophylaxis [x] Lovenox, []  Heparin, [] SCDs, [] Ambulation,  [] Eliquis, [] Xarelto  [] Coumadin   Code Status Full Code   Disposition From: [facility]  Expected Disposition: Home  Estimated Date of Discharge: Home  Patient requires continued admission due to TBD   Surrogate Decision Maker/ JALEN Lannystan West     Personally reviewed Lab Studies and Imaging     Discussed management of the case with Dr. Orozco my supervising physician who is in agreement with the above-noted plan of care.        Drugs that require monitoring for toxicity include Lovenox and the method of monitoring was CBC    Other drugs that require monitoring for toxicity include insulin and the method of monitoring is serial Accu-Cheks for hypoglycemia.    Subjective:     Chief Complaint: Physical debility    Patient seen and examined today at bedside he is doing well he has no complaints.  He is working well with physical therapy.      Review of Systems:      Pertinent positives and  quality is technically difficult.     MRI BRAIN W WO CONTRAST    Result Date: 2/5/2025  MRI BRAIN W WO CONTRAST 2/5/2025 16:18 HISTORY:AMS, possible seizure GADOTERIDOL 279.3 MG/ML IV SOLN TECHNICAL FACTORS: Standard institutional protocol was followed. Intravenous contrast was administered. COMPARISON:None FINDINGS:The ventricles are normal in size and there is no midline shift or extra-axial fluid collection. On the FLAIR sequence, there are numerous small hyperintensities in the cerebral  white matter bilaterally. No restricted diffusion is seen. After the administration of intravenous contrast, no abnormal brain enhancement is demonstrated. It must be noted that the study was considerably compromised by motion. IMPRESSION: 1. STUDY IS CONSIDERABLY COMPROMISED BY MOTION. ONLY GROSS OBSERVATIONS ARE POSSIBLE. 2. MILD NONSPECIFIC AND NONACUTE WHITE MATTER DISEASE WITHOUT EVIDENCE OF ACUTE INFARCT 3. NO GROSS MASS OR ABNORMAL ENHANCEMENT. Workstation ID: [id number]  Dictated and Electronically Signed By: Paramjit Gustafson MD 2/5/2025 15:55            Lipids:   Lab Results   Component Value Date/Time    CHOL 158 08/01/2017 08:16 AM    HDL 35 08/01/2017 08:16 AM    TRIG 195 08/01/2017 08:16 AM     Hemoglobin A1C:   Lab Results   Component Value Date/Time    LABA1C 11.3 08/01/2017 08:16 AM     TSH:   Lab Results   Component Value Date/Time    TSH 4.32 02/04/2025 09:39 PM         UA:  Lab Results   Component Value Date/Time    NITRU NEGATIVE 02/04/2025 09:37 PM    COLORU Yellow 02/04/2025 09:37 PM    PHUR 7.0 02/04/2025 09:37 PM    PHUR 5.5 04/15/2016 07:55 PM    LABCAST NONE SEEN 11/05/2011 09:39 AM    WBCUA 0 TO 5 02/04/2025 09:37 PM    WBCUA 0-5 11/05/2011 09:39 AM    RBCUA 6 TO 9 02/04/2025 09:37 PM    RBCUA NONE SEEN 11/05/2011 09:39 AM    MUCUS NONE SEEN 11/05/2011 09:39 AM    BACTERIA NONE SEEN 11/05/2011 09:39 AM    CLARITYU CLOUDY 04/15/2016 07:55 PM    LEUKOCYTESUR NEGATIVE 02/04/2025 09:37 PM

## 2025-02-12 NOTE — PROGRESS NOTES
Patient has been up in room ad brit refusing bed alarm chair alarm or personal alarm. Patient educated on the reasoning behind the bed alarms to remind him to call for help so he won't fall. Patient voiced understanding but does not want any alarms on. Patient signed a refusal for treatment voicing understanding that if he falls because he doesn't call for help and has no alarm on bed chair or person, he will be signing release of responsibility to facility for injury that may occur.

## 2025-02-12 NOTE — PLAN OF CARE
Problem: Chronic Conditions and Co-morbidities  Goal: Patient's chronic conditions and co-morbidity symptoms are monitored and maintained or improved  2/12/2025 0850 by Telma Stoddard RN  Outcome: Progressing  2/11/2025 2255 by Jeimy Bailey RN  Outcome: Progressing     Problem: Safety - Adult  Goal: Free from fall injury  2/12/2025 0850 by Telma Stoddard RN  Outcome: Progressing  2/11/2025 2255 by Jeimy Bailey RN  Outcome: Progressing     Problem: ABCDS Injury Assessment  Goal: Absence of physical injury  2/12/2025 0850 by Telma Stoddard RN  Outcome: Progressing  2/11/2025 2255 by Jeimy Bailey RN  Outcome: Progressing     Problem: Pain  Goal: Verbalizes/displays adequate comfort level or baseline comfort level  2/12/2025 0850 by Telma Stoddard RN  Outcome: Progressing  2/11/2025 2255 by Jeimy Bailey RN  Outcome: Progressing     Problem: Discharge Planning  Goal: Discharge to home or other facility with appropriate resources  2/12/2025 0850 by Telma Stoddard RN  Outcome: Progressing  2/11/2025 2255 by Jeimy Bailey RN  Outcome: Progressing     Problem: Musculoskeletal - Adult  Goal: Return mobility to safest level of function  2/12/2025 0850 by Telma Stoddard RN  Outcome: Progressing  2/11/2025 2255 by Jeimy Bailey RN  Outcome: Progressing  Goal: Maintain proper alignment of affected body part  2/12/2025 0850 by Telma Stoddard RN  Outcome: Progressing  2/11/2025 2255 by Jeimy Bailey RN  Outcome: Progressing  Goal: Return ADL status to a safe level of function  2/12/2025 0850 by Telma Stoddard RN  Outcome: Progressing  2/11/2025 2255 by Jeimy Bailey RN  Outcome: Progressing

## 2025-02-13 LAB
GLUCOSE BLD-MCNC: 136 MG/DL (ref 74–99)
GLUCOSE BLD-MCNC: 170 MG/DL (ref 74–99)
GLUCOSE BLD-MCNC: 184 MG/DL (ref 74–99)
GLUCOSE BLD-MCNC: 271 MG/DL (ref 74–99)

## 2025-02-13 PROCEDURE — 1200000002 HC SEMI PRIVATE SWING BED

## 2025-02-13 PROCEDURE — 6370000000 HC RX 637 (ALT 250 FOR IP): Performed by: NURSE PRACTITIONER

## 2025-02-13 PROCEDURE — 6370000000 HC RX 637 (ALT 250 FOR IP): Performed by: FAMILY MEDICINE

## 2025-02-13 PROCEDURE — 6360000002 HC RX W HCPCS: Performed by: NURSE PRACTITIONER

## 2025-02-13 PROCEDURE — 82962 GLUCOSE BLOOD TEST: CPT

## 2025-02-13 PROCEDURE — 97530 THERAPEUTIC ACTIVITIES: CPT

## 2025-02-13 PROCEDURE — 97110 THERAPEUTIC EXERCISES: CPT

## 2025-02-13 PROCEDURE — 97116 GAIT TRAINING THERAPY: CPT

## 2025-02-13 PROCEDURE — 94761 N-INVAS EAR/PLS OXIMETRY MLT: CPT

## 2025-02-13 RX ORDER — TORSEMIDE 5 MG/1
15 TABLET ORAL DAILY
Status: DISCONTINUED | OUTPATIENT
Start: 2025-02-14 | End: 2025-02-14 | Stop reason: HOSPADM

## 2025-02-13 RX ADMIN — INSULIN LISPRO 2 UNITS: 100 INJECTION, SOLUTION INTRAVENOUS; SUBCUTANEOUS at 12:27

## 2025-02-13 RX ADMIN — INSULIN LISPRO 5 UNITS: 100 INJECTION, SOLUTION INTRAVENOUS; SUBCUTANEOUS at 08:55

## 2025-02-13 RX ADMIN — ENOXAPARIN SODIUM 30 MG: 100 INJECTION SUBCUTANEOUS at 08:57

## 2025-02-13 RX ADMIN — TORSEMIDE 15 MG: 10 TABLET ORAL at 09:04

## 2025-02-13 RX ADMIN — INSULIN GLARGINE 30 UNITS: 100 INJECTION, SOLUTION SUBCUTANEOUS at 20:47

## 2025-02-13 RX ADMIN — FERROUS SULFATE TAB 325 MG (65 MG ELEMENTAL FE) 325 MG: 325 (65 FE) TAB at 08:57

## 2025-02-13 RX ADMIN — CHOLECALCIFEROL TAB 25 MCG (1000 UNIT) 1000 UNITS: 25 TAB at 08:59

## 2025-02-13 RX ADMIN — PREGABALIN 150 MG: 75 CAPSULE ORAL at 09:05

## 2025-02-13 RX ADMIN — ENOXAPARIN SODIUM 30 MG: 100 INJECTION SUBCUTANEOUS at 20:48

## 2025-02-13 RX ADMIN — ASPIRIN 81 MG: 81 TABLET, COATED ORAL at 08:56

## 2025-02-13 RX ADMIN — INSULIN LISPRO 5 UNITS: 100 INJECTION, SOLUTION INTRAVENOUS; SUBCUTANEOUS at 17:24

## 2025-02-13 RX ADMIN — PREGABALIN 150 MG: 75 CAPSULE ORAL at 20:48

## 2025-02-13 RX ADMIN — INSULIN LISPRO 4 UNITS: 100 INJECTION, SOLUTION INTRAVENOUS; SUBCUTANEOUS at 20:48

## 2025-02-13 RX ADMIN — HYDROCODONE BITARTRATE AND ACETAMINOPHEN 1 TABLET: 5; 325 TABLET ORAL at 18:23

## 2025-02-13 RX ADMIN — LEVOTHYROXINE SODIUM 100 MCG: 0.1 TABLET ORAL at 05:44

## 2025-02-13 RX ADMIN — INSULIN LISPRO 5 UNITS: 100 INJECTION, SOLUTION INTRAVENOUS; SUBCUTANEOUS at 12:28

## 2025-02-13 RX ADMIN — TAMSULOSIN HYDROCHLORIDE 0.4 MG: 0.4 CAPSULE ORAL at 20:48

## 2025-02-13 ASSESSMENT — PAIN SCALES - GENERAL
PAINLEVEL_OUTOF10: 7
PAINLEVEL_OUTOF10: 0
PAINLEVEL_OUTOF10: 4

## 2025-02-13 ASSESSMENT — PAIN - FUNCTIONAL ASSESSMENT: PAIN_FUNCTIONAL_ASSESSMENT: INTOLERABLE, UNABLE TO DO ANY ACTIVE OR PASSIVE ACTIVITIES

## 2025-02-13 ASSESSMENT — PAIN DESCRIPTION - ORIENTATION: ORIENTATION: MID

## 2025-02-13 ASSESSMENT — PAIN DESCRIPTION - LOCATION: LOCATION: BACK

## 2025-02-13 ASSESSMENT — PAIN DESCRIPTION - DESCRIPTORS: DESCRIPTORS: CRAMPING;SHARP

## 2025-02-13 NOTE — PROGRESS NOTES
Pt was moved to room 1116 from room 11 on the [place] inpatient unit.  This Rn assumed the care at this time.

## 2025-02-13 NOTE — PROGRESS NOTES
Occupational Therapy  [x] daily progress note   [] discharge     Date:  2025 Room:  [address] [place] [address]    Date of Admission: 2025  Patient Name:  Pino West   :  1955 MRN: [id number]    Primary Problem    Patient Active Problem List   Diagnosis    Hyperlipidemia    ASHD (arteriosclerotic heart disease)    Lumbar herniated disc    Radiculopathy of leg    Peripheral neuropathy    H/O cardiovascular stress test    Controlled substance agreement signed    Type 2 diabetes mellitus with complication, with long-term current use of insulin (HCC)    AMS (altered mental status)    Syncope    Physical debility     Rehabilitation Diagnosis:     Physical debility [R53.81]   Physical debility     Restrictions/Precautions:Restrictions/Precautions  Restrictions/Precautions: Fall Risk    Social History  Social/Functional History  Lives With: Spouse  Type of Home: House  Home Layout: One level  Home Access: Level entry  Bathroom Shower/Tub: Walk-in shower  Bathroom Toilet: Standard  Bathroom Equipment: Shower chair  Bathroom Accessibility: Accessible  Home Equipment: Cane, Rollator  Has the patient had two or more falls in the past year or any fall with injury in the past year?: Yes  Receives Help From: Family  Prior Level of Assist for ADLs: Independent  Prior Level of Assist for Homemaking: Independent  Homemaking Responsibilities: Yes  Meal Prep Responsibility: Primary  Laundry Responsibility: Primary  Cleaning Responsibility: Primary  Shopping Responsibility: Primary  Prior Level of Assist for Ambulation: Independent household ambulator, with or without device  Prior Level of Assist for Transfers: Independent  Active : Yes  Occupation: Retired  Additional Comments: Patient takes care of wife currently. Sleeps in a flat bed      Subjective                                                                           Patient states:  Pt agreeable for therapy  Pain: Location & Type:     Intensity (0-10/10):  no  Other:     Adverse Reaction to Tx:    [x] None  [] Other:   Significant Change in Status:     [x] None  [] Other:    Assessment / Impression                                                          Treatment/Activity Tolerance:   [x] Tolerated Treatment well    [x] Patient limited by fatigue/pain      [] Patient limited by medical complications   [] Additional comments/Other:      Treatment Plan for Next Session:   [x] Continue with occupational therapy sessions.  [] Focus on:     Barriers to Participation/Progress  Assessment / Impression:    Adverse Reaction: None  Significant change in status and impact:  None  Barriers to improvement:  Dec strength and endurance    Goals: Goals updated (name & date):             Short Term Goals  Time Frame for Short Term Goals: until discharge or all goals met  Short Term Goal 1: Patient will compelte functional transfers MI using AE PRN  Short Term Goal 2: Patient will complete all toilet tasks MI  Short Term Goal 3: Patient will complete UB ADLs MI  Short Term Goal 4: Patient will complete LB ADLs MI using AE PRN  Short Term Goal 5: Patient will complete 10+ minutes of ther ex/ther act using AE PRN for general strengthening with ADLs:    :        Plan of Care                                                                        Times Per Week: 4+              Electronically signed by VINNIE Haynes/L OBINNA 1992 2/13/2025, 2:35 PM;

## 2025-02-13 NOTE — CARE COORDINATION
CM submitted updated clinical documentation to [facility] as requested.  CM will follow.

## 2025-02-13 NOTE — PLAN OF CARE
Problem: Chronic Conditions and Co-morbidities  Goal: Patient's chronic conditions and co-morbidity symptoms are monitored and maintained or improved  Outcome: Progressing     Problem: Safety - Adult  Goal: Free from fall injury  Outcome: Progressing     Problem: ABCDS Injury Assessment  Goal: Absence of physical injury  Outcome: Progressing     Problem: Pain  Goal: Verbalizes/displays adequate comfort level or baseline comfort level  Outcome: Progressing     Problem: Discharge Planning  Goal: Discharge to home or other facility with appropriate resources  Outcome: Progressing     Problem: Musculoskeletal - Adult  Goal: Return mobility to safest level of function  Outcome: Progressing  Goal: Maintain proper alignment of affected body part  Outcome: Progressing  Goal: Return ADL status to a safe level of function  Outcome: Progressing

## 2025-02-13 NOTE — FLOWSHEET NOTE
Physical Therapy Treatment Note   Date: 2025 Room: [unfilled]   Name: Pino West : 1955   MRN: [id number] Admission Date:2025    Primary Problem:   Past Medical History:   Diagnosis Date    Adenomatous polyp of colon     Bulging discs     CABG (coronary artery bypass graft) -2001    X 3 vessels    CAD (coronary artery disease) -    S/P CABG X 3 vessels    Diabetes mellitus (HCC)     diagnosed two weeks ago    H/O cardiovascular stress test 2015    cardiolite-normal, no ischemia, EF67%    Hyperlipidemia     Hypotestosteronism     Lumbar facet arthropathy     Right and Left L4-5/L5-S1-sees Dr Avilez    Nausea & vomiting     Obstructive sleep apnea on CPAP 12    CPAP 10cm    Prostate hypertrophy     Proteinuria     Screening colonoscopy 2008    3 tubular adenomas, 3 hyperplastic polyps with recommended follow up in 3 years per Dr Gavin Wagner    Smoking hx      Past Surgical History:   Procedure Laterality Date    ANTERIOR CRUCIATE LIGAMENT REPAIR      left knee-partial knee replacement    CARDIAC SURGERY      CABG X3 vessels    CHOLECYSTECTOMY      COLONOSCOPY      CORONARY ARTERY BYPASS GRAFT  2-2001    X 3 vessels    HERNIA REPAIR      umbilical hernia    PILONIDAL CYST EXCISION      TONSILLECTOMY      UMBILICAL HERNIA REPAIR       Rehabilitation Diagnosis:   Restrictions/Precautions:     Subjective:  supine in bed upon entering and agreeable to working with therapy   Initial Pain level:  does not rate    Goals:                   Short Term Goals  Time Frame for Short Term Goals: 1 week  Short Term Goal 1: pt to complete all bed mobility mod I  Short Term Goal 2: pt to complete all STS transfers to/from bed, commode, and chair Mod I  Short Term Goal 3: pt to ambulate 150' with LRAD SBA               Plan of Care:                          Current Treatment Recommendations: Transfer training, Functional mobility training, Endurance training      Objective Findings:    Date:

## 2025-02-14 VITALS
BODY MASS INDEX: 38 KG/M2 | HEART RATE: 76 BPM | OXYGEN SATURATION: 93 % | DIASTOLIC BLOOD PRESSURE: 80 MMHG | TEMPERATURE: 97.3 F | HEIGHT: 68 IN | WEIGHT: 250.7 LBS | SYSTOLIC BLOOD PRESSURE: 154 MMHG | RESPIRATION RATE: 18 BRPM

## 2025-02-14 LAB — GLUCOSE BLD-MCNC: 153 MG/DL (ref 74–99)

## 2025-02-14 PROCEDURE — 82962 GLUCOSE BLOOD TEST: CPT

## 2025-02-14 PROCEDURE — 6370000000 HC RX 637 (ALT 250 FOR IP): Performed by: NURSE PRACTITIONER

## 2025-02-14 PROCEDURE — 6370000000 HC RX 637 (ALT 250 FOR IP): Performed by: FAMILY MEDICINE

## 2025-02-14 PROCEDURE — 6360000002 HC RX W HCPCS: Performed by: NURSE PRACTITIONER

## 2025-02-14 RX ORDER — TORSEMIDE 5 MG/1
15 TABLET ORAL DAILY
Qty: 30 TABLET | Refills: 0 | Status: SHIPPED | OUTPATIENT
Start: 2025-02-15

## 2025-02-14 RX ADMIN — TORSEMIDE 15 MG: 5 TABLET ORAL at 08:06

## 2025-02-14 RX ADMIN — CHOLECALCIFEROL TAB 25 MCG (1000 UNIT) 1000 UNITS: 25 TAB at 08:03

## 2025-02-14 RX ADMIN — INSULIN LISPRO 5 UNITS: 100 INJECTION, SOLUTION INTRAVENOUS; SUBCUTANEOUS at 08:03

## 2025-02-14 RX ADMIN — FERROUS SULFATE TAB 325 MG (65 MG ELEMENTAL FE) 325 MG: 325 (65 FE) TAB at 08:03

## 2025-02-14 RX ADMIN — ENOXAPARIN SODIUM 30 MG: 100 INJECTION SUBCUTANEOUS at 08:02

## 2025-02-14 RX ADMIN — ASPIRIN 81 MG: 81 TABLET, COATED ORAL at 08:03

## 2025-02-14 RX ADMIN — PREGABALIN 150 MG: 75 CAPSULE ORAL at 08:02

## 2025-02-14 RX ADMIN — LEVOTHYROXINE SODIUM 100 MCG: 0.1 TABLET ORAL at 05:28

## 2025-02-14 NOTE — DISCHARGE SUMMARY
V2.0  Discharge Summary    Name:  Pino West /Age/Sex: 1955 (69 y.o. male)   Admit Date: 2025  Discharge Date: 25    MRN & CSN:  [id number] & [id number] Encounter Date and Time 25 8:29 AM EST    Attending:  Paul Morris MD Discharging Provider: MADI MORALES NP       Hospital Course:     Brief HPI: Pino West is a 69 y.o. male who presented with ***    Brief Problem Based Course:   ***      The patient expressed appropriate understanding of, and agreement with the discharge recommendations, medications, and plan.     Consults this admission:  IP CONSULT TO HOME CARE NEEDS    Discharge Diagnosis:   Physical debility    ***    Discharge Instruction:   Follow up appointments: ***  Primary care physician: Hai Yadav MD within 1 week  Diet: {diet:27741}   Activity: {discharge activity:80369}  Disposition: Discharged to:   []Home, [][facility], []SNF, []Acute Rehab, []Hospice ***  Condition on discharge: Stable  Labs and Tests to be Followed up as an outpatient by PCP or Specialist: ***    Discharge Medications:        Medication List        CHANGE how you take these medications      torsemide 5 MG tablet  Commonly known as: DEMADEX  Take 3 tablets by mouth daily  Start taking on: February 15, 2025  What changed: medication strength            CONTINUE taking these medications      aspirin 81 MG EC tablet     atorvastatin 10 MG tablet  Commonly known as: Lipitor  Take 1 tablet by mouth daily In the evening for cholesterol.     CENTRUM SILVER PO     empagliflozin 10 MG tablet  Commonly known as: JARDIANCE     ferrous sulfate 325 (65 Fe) MG tablet  Commonly known as: IRON 325     glipiZIDE 5 MG tablet  Commonly known as: GLUCOTROL     HYDROcodone-acetaminophen 5-325 MG per tablet  Commonly known as: NORCO  Take 1 tablet by mouth 3 times daily as needed for Pain .     Insulin Pen Needle 31G X 8 MM Misc  1 each by Does not apply route daily     levothyroxine 100 MCG tablet  Commonly

## 2025-02-14 NOTE — PLAN OF CARE
Problem: Chronic Conditions and Co-morbidities  Goal: Patient's chronic conditions and co-morbidity symptoms are monitored and maintained or improved  2/14/2025 0952 by Kori Mills RN ([place])  Outcome: Progressing  2/13/2025 2219 by Krystin Love RN  Outcome: Progressing     Problem: Safety - Adult  Goal: Free from fall injury  2/14/2025 0952 by Kori Mills RN ([place])  Outcome: Progressing  2/13/2025 2219 by Krystin Love RN  Outcome: Progressing     Problem: ABCDS Injury Assessment  Goal: Absence of physical injury  2/14/2025 0952 by Kori Mills RN ([place])  Outcome: Progressing  2/13/2025 2219 by Krystin Love RN  Outcome: Progressing     Problem: Pain  Goal: Verbalizes/displays adequate comfort level or baseline comfort level  2/14/2025 0952 by Kori Mills RN ([place])  Outcome: Progressing  2/13/2025 2219 by Krystin Love RN  Outcome: Progressing     Problem: Discharge Planning  Goal: Discharge to home or other facility with appropriate resources  2/14/2025 0952 by Kori Mills RN ([place])  Outcome: Progressing  2/13/2025 2219 by Krystin Love RN  Outcome: Progressing     Problem: Musculoskeletal - Adult  Goal: Return mobility to safest level of function  2/14/2025 0952 by Kori Mills RN ([place])  Outcome: Progressing  2/13/2025 2219 by Krystin Love RN  Outcome: Progressing  Goal: Maintain proper alignment of affected body part  2/14/2025 0952 by Kori Mills RN ([place])  Outcome: Progressing  2/13/2025 2219 by Krystin Love RN  Outcome: Progressing  Goal: Return ADL status to a safe level of function  2/14/2025 0952 by Kori Mills RN ([place])  Outcome: Progressing  2/13/2025 2219 by Krystin Love RN  Outcome: Progressing

## 2025-02-14 NOTE — PLAN OF CARE
Problem: Chronic Conditions and Co-morbidities  Goal: Patient's chronic conditions and co-morbidity symptoms are monitored and maintained or improved  2/13/2025 2219 by Krystin Love RN  Outcome: Progressing  2/13/2025 1348 by Kori Mills)MERY  Outcome: Progressing     Problem: Safety - Adult  Goal: Free from fall injury  2/13/2025 2219 by Krystin Love RN  Outcome: Progressing  2/13/2025 1348 by Kori Mills)MERY  Outcome: Progressing     Problem: ABCDS Injury Assessment  Goal: Absence of physical injury  2/13/2025 2219 by Krystin Love RN  Outcome: Progressing  2/13/2025 1348 by Kori Mills)MERY  Outcome: Progressing     Problem: Pain  Goal: Verbalizes/displays adequate comfort level or baseline comfort level  2/13/2025 2219 by Krystin Love RN  Outcome: Progressing  2/13/2025 1348 by Kori Mills)MERY  Outcome: Progressing     Problem: Discharge Planning  Goal: Discharge to home or other facility with appropriate resources  2/13/2025 2219 by Krystin Love RN  Outcome: Progressing  2/13/2025 1348 by Kori Mills)MERY  Outcome: Progressing     Problem: Musculoskeletal - Adult  Goal: Return mobility to safest level of function  2/13/2025 2219 by Krystin Love RN  Outcome: Progressing  2/13/2025 1348 by Kori Mills)MERY  Outcome: Progressing  Goal: Maintain proper alignment of affected body part  2/13/2025 2219 by Krystin Love RN  Outcome: Progressing  2/13/2025 1348 by Kori Mills)MERY  Outcome: Progressing  Goal: Return ADL status to a safe level of function  2/13/2025 2219 by Krystin Love RN  Outcome: Progressing  2/13/2025 1348 by Kori Mills)MERY  Outcome: Progressing

## 2025-02-14 NOTE — CARE COORDINATION
CM notified by Faith REBOLLEDO that the patient has chosen to be discharged today.  CM will follow.     8:25 AM  CM met with the patient for a follow-up discussion regarding discharge planning, patient sitting up on the edge of the bed eating breakfast on room air.  Patient confirmed that he plans to return home today.  Patient declined [facility] services following discharge and is unable to identify any needs at this time.  CM available if needs arise.

## 2025-02-22 NOTE — PROGRESS NOTES
Physician Progress Note      PATIENT:               TAMMI NGUYEN  [facility]:                  [id number]  :                       1955  ADMIT DATE:       2025 3:55 AM  DISCH DATE:        2025 7:25 PM  RESPONDING  PROVIDER #:        EUNICE BARRAZA - LEON          QUERY TEXT:    Patient admitted with Nontraumatic rhabdomyolysis and noted to have   Temp-101.9>99.6, , RR, La 4.0. If possible, please document in progress   notes and discharge summary if you are evaluating and/or treating any of the   following:    The medical record reflects the following:  Risk Factors:  BPH, CAD s/p CABG x 3, DM-2, HLD, Hypotestosteronism,   Hypothyroidism, NATHANAEL on CPAP, and Peripheral Neuropathy,  who presents with AMS   (altered mental status)  Clinical Indicators: Temp from .9>99.6  HR-94, LA 4.0 WBC-4, ALT 49, AST   100 encephalopathy -Lactate 4.0>2.5, improved after IVF, WBC 4.0, Procal   0.30, afebrile, no cough or dysuria or signs of infection  Treatment: 1. MRSA Cx: negative, Blood CX x 2: pending, Urine CX: pending Cont   IV Vancomycin and Cefepime while awaiting cultures Neuro Consult: ordered EEG   and MRI Brain to r/o seizures.  On antibiotics for potential infection since   patient had fever, cxr, UA, IV fluids serial labs,    Thank-You, Mell Ramos RN, BSN, CCDS  Options provided:  -- SIRS of non-infectious origin due to rhabdomylsis  associated with   metabolic encephalopathy and lactic acidosis (acute organ dysfunction),   resolved.  -- Other - I will add my own diagnosis  -- Disagree - Not applicable / Not valid  -- Disagree - Clinically unable to determine / Unknown  -- Refer to Clinical Documentation Reviewer    PROVIDER RESPONSE TEXT:    This patient has SIRS of non-infectious origin due to rhabdomylsis associated   with metabolic encephalopathy and lactic acidosis (acute organ dysfunction),   resolved.    Query created by: Lennie Ramos on 2025 10:43 AM      QUERY

## 2025-07-18 NOTE — FLOWSHEET NOTE
Patient Name: Erickson Gerard  : 1947    MRN: [id number]                              Today's Date: 2025       Admit Date: 7/15/2025    Visit Dx:     ICD-10-CM ICD-9-CM   1. Decreased activities of daily living (ADL)  Z78.9 V49.89   2. Coronary artery disease involving native heart, unspecified vessel or lesion type, unspecified whether angina present  I25.10 414.01   3. Abnormal findings on diagnostic imaging of heart and coronary circulation  R93.1 794.39   4. S/P CABG (coronary artery bypass graft)  Z95.1 V45.81     Patient Active Problem List   Diagnosis    Essential hypertension    GERD (gastroesophageal reflux disease)    Medicare annual wellness visit, subsequent    Benign prostatic hyperplasia with urinary frequency    Umbilical hernia without obstruction and without gangrene    Colon cancer screening    Mixed hyperlipidemia    Localized edema    Right leg swelling    Aneurysm of right popliteal artery    Acute deep vein thrombosis (DVT) of distal vein of right lower extremity    DDD (degenerative disc disease), lumbar    Actinic keratosis of forehead    Aneurysm of right popliteal artery    Abdominal aortic aneurysm (AAA) 3.0 cm to 5.5 cm in diameter in male    PVD (peripheral vascular disease)    High risk medication use    Statin intolerance    Immunization deficiency    Chronic deep vein thrombosis (DVT)    Encounter for hepatitis C screening test for low risk patient    Peripheral artery aneurysm    Right elbow pain    Blurred vision    Nausea    Dizziness    Visual disturbance    Preoperative cardiovascular examination    Coronary artery disease of native artery of native heart with stable angina pectoris    Abnormal stress test    Coronary artery disease involving native heart    Abnormal findings on diagnostic imaging of heart and coronary circulation    Hx of CABG     Past Medical History:   Diagnosis Date    AAA (abdominal aortic aneurysm) without rupture 10/14/2021    Abdominal aortic  Physical Therapy Treatment Note   Date: 2025 Room: [unfilled]   Name: Pino West : 1955   MRN: [id number] Admission Date:2025    Primary Problem:   Past Medical History:   Diagnosis Date    Adenomatous polyp of colon     Bulging discs     CABG (coronary artery bypass graft) -2001    X 3 vessels    CAD (coronary artery disease) -    S/P CABG X 3 vessels    Diabetes mellitus (HCC)     diagnosed two weeks ago    H/O cardiovascular stress test 2015    cardiolite-normal, no ischemia, EF67%    Hyperlipidemia     Hypotestosteronism     Lumbar facet arthropathy     Right and Left L4-5/L5-S1-sees Dr Avilez    Nausea & vomiting     Obstructive sleep apnea on CPAP 12    CPAP 10cm    Prostate hypertrophy     Proteinuria     Screening colonoscopy 2008    3 tubular adenomas, 3 hyperplastic polyps with recommended follow up in 3 years per Dr Gavin Wagner    Smoking hx      Past Surgical History:   Procedure Laterality Date    ANTERIOR CRUCIATE LIGAMENT REPAIR      left knee-partial knee replacement    CARDIAC SURGERY      CABG X3 vessels    CHOLECYSTECTOMY      COLONOSCOPY      CORONARY ARTERY BYPASS GRAFT  2-2001    X 3 vessels    HERNIA REPAIR      umbilical hernia    PILONIDAL CYST EXCISION      TONSILLECTOMY      UMBILICAL HERNIA REPAIR       Rehabilitation Diagnosis:   Restrictions/Precautions:     Subjective:   seated at EOB upon entering and agreeable to working with therapy   Initial Pain level:  does not rate    Goals:                   Short Term Goals  Time Frame for Short Term Goals: 1 week  Short Term Goal 1: pt to complete all bed mobility mod I  Short Term Goal 2: pt to complete all STS transfers to/from bed, commode, and chair Mod I  Short Term Goal 3: pt to ambulate 150' with LRAD SBA               Plan of Care:                          Current Treatment Recommendations: Transfer training, Functional mobility training, Endurance training      Objective Findings:    Date:  aneurysm, without rupture, unspecified 03/14/2023    Acute embolism and thrombosis of unspecified deep veins of right distal lower extremity     Anesthesia complication     AFTER VEIN SURGERY STATES HE WAS HARD TO WAKE UP    Aneurysm of artery of left lower extremity     Aneurysm of artery of lower extremity 09/09/2021    Arthritis     At risk for sleep apnea     STOP BANG 5    Coronary artery disease     Dyspnea on exertion     Essential (primary) hypertension     GERD (gastroesophageal reflux disease)     [facility] (hard of hearing)     Hyperlipidemia     Hypertension     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    Localized edema     Low back pain     Mixed hyperlipidemia     Other specified soft tissue disorders     PAD (peripheral artery disease)     Peripheral artery aneurysm     PONV (postoperative nausea and vomiting)     Popliteal aneurysm     Thrombosis 09/09/2021    Calf Vein Thrombosis Rt tibial vein    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     Past Surgical History:   Procedure Laterality Date    ANGIOPLASTY POPLITEAL ARTERY Right 11/09/2021    Procedure: RIGHT POPITEAL ANEURYSM REPAIR VIABOHN;  Surgeon: Campos Archer MD;  Location: [facility] OR 18/19;  Service: Vascular;  Laterality: Right;    ANGIOPLASTY POPLITEAL ARTERY Right 01/25/2022    Procedure: AORTO ILIAC FEMORAL RIGHT POPLITEAL VIABOHN STENT PLACEMENT X 2;  Surgeon: Campos Archer MD;  Location: [facility] OR 18/19;  Service: Vascular;  Laterality: Right;    CARDIAC CATHETERIZATION N/A 06/19/2025    Procedure: Left Heart Cath;  Surgeon: Oneil Escobar MD;  Location: [facility] INVASIVE LOCATION;  Service: Cardiovascular;  Laterality: N/A;    CARDIAC CATHETERIZATION N/A 06/19/2025    Procedure: Left ventriculography;  Surgeon: Oneil Escobar MD;  Location: [facility] INVASIVE LOCATION;  Service: Cardiovascular;  Laterality: N/A;    COLONOSCOPY      CORONARY ARTERY BYPASS GRAFT N/A 7/15/2025    Procedure: STERNOTOMY; CORONARY ARTERY BYPASS  GRAFTING TIMES 6 USING LEFT INTERNAL MAMMARY ARTERY AND LEFT SAPHENOUS VEIN; TRANSESOPHAGEAL ECHOCARDIOGRAM WITH ANESTHESIA;LEFT ATRIAL APPENDAGE EXCLUSION; PRP;  Surgeon: Jr Oscar Patino MD;  Location: [facility];  Service: Cardiothoracic;  Laterality: N/A;    EYE SURGERY Bilateral     CATARACTS    HERNIA REPAIR Bilateral     POLITEAL ARTERY ANEURYSM REPAIR Right 11/09/2021    Viabahn Stent Graft    VEIN LIGATION AND STRIPPING N/A       General Information       Row Name 07/18/25 1117          Physical Therapy Time and Intention    Document Type therapy note (daily note)  -MS     Mode of Treatment physical therapy;individual therapy  -MS       Row Name 07/18/25 1117          General Information    Patient Profile Reviewed yes  -MS     Existing Precautions/Restrictions cardiac;sternal;oxygen therapy device and L/min;fall   Pt. on AIRVO for oxygen  -MS     Barriers to Rehab none identified  -MS       Row Name 07/18/25 1117          Cognition    Orientation Status (Cognition) oriented x 3  -MS               User Key  (r) = Recorded By, (t) = Taken By, (c) = Cosigned By      Initials Name Provider Type    MS Campos Costa, PT Physical Therapist                   Mobility       Row Name 07/18/25 1117          Bed Mobility    Bed Mobility supine-sit;sit-supine  -MS     Supine-Sit Tinnie (Bed Mobility) minimum assist (75% patient effort)  -MS       Row Name 07/18/25 1117          Sit-Stand Transfer    Sit-Stand Tinnie (Transfers) contact guard;2 person assist  -MS     Assistive Device (Sit-Stand Transfers) --  HHA x 2  -MS       Row Name 07/18/25 1117          Gait/Stairs (Locomotion)    Tinnie Level (Gait) contact guard;2 person assist  -MS     Assistive Device (Gait) --  HHA x 2  -MS     Distance in Feet (Gait) 15  -MS     Deviations/Abnormal Patterns (Gait) antonella decreased  -MS     Bilateral Gait Deviations forward flexed posture  -MS     Comment, (Gait/Stairs) Verbal/tactile cues given  for posture correction.  Limited in upright mobility due to decrease in oxygen saturations (72%) during ambulation.  x 2 seated rest breaks to increase oxygen saturations and for safety.  -MS               User Key  (r) = Recorded By, (t) = Taken By, (c) = Cosigned By      Initials Name Provider Type    Campos Allen, PT Physical Therapist                   Obj/Interventions       Row Name 07/18/25 1119          Motor Skills    Therapeutic Exercise --  Cardiac ther. ex. program x 10 reps completed  -MS               User Key  (r) = Recorded By, (t) = Taken By, (c) = Cosigned By      Initials Name Provider Type    Campos Allen, PT Physical Therapist                   Goals/Plan    No documentation.                  Clinical Impression       Row Name 07/18/25 1119          Pain    Pretreatment Pain Rating 2/10  -MS     Posttreatment Pain Rating 2/10  -MS     Pain Location --  Midsternal chest pain  -MS       Row Name 07/18/25 1119          Vital Signs    Pre SpO2 (%) 98  -MS     O2 Delivery Pre Treatment supplemental O2   AIRVO oxygen on before, throughout therapy session, and afterwards  -MS     Intra SpO2 (%) 72  -MS     O2 Delivery Intra Treatment supplemental O2  -MS     Post SpO2 (%) 97  -MS     O2 Delivery Post Treatment supplemental O2  -MS     Recovery Time Cues for deep breathes (in through nose and out through mouth)  -MS       Row Name 07/18/25 1119          Positioning and Restraints    Pre-Treatment Position in bed  -MS     Post Treatment Position chair  -MS     In Chair notified nsg;reclined;sitting;call light within reach;encouraged to call for assist;with nsg;RUE elevated;LUE elevated  All lines/tubes intact. V.S.S.  -MS               User Key  (r) = Recorded By, (t) = Taken By, (c) = Cosigned By      Initials Name Provider Type    Campos Allen, PT Physical Therapist                   Outcome Measures       Row Name 07/18/25 1121          How much help from another person do you  currently need...    Turning from your back to your side while in flat bed without using bedrails? 3  -MS     Moving from lying on back to sitting on the side of a flat bed without bedrails? 3  -MS     Moving to and from a bed to a chair (including a wheelchair)? 3  -MS     Standing up from a chair using your arms (e.g., wheelchair, bedside chair)? 3  -MS     Climbing 3-5 steps with a railing? 2  -MS     To walk in hospital room? 3  -MS     AM-PAC 6 Clicks Score (PT) 17  -MS     Highest Level of Mobility Goal Stand (1 or More Minutes)-5  -MS       Row Name 07/18/25 1121          Functional Assessment    Outcome Measure Options AM-PAC 6 Clicks Basic Mobility (PT)  -MS               User Key  (r) = Recorded By, (t) = Taken By, (c) = Cosigned By      Initials Name Provider Type    MS Campos Costa, PT Physical Therapist                                 Physical Therapy Education       Title: PT OT SLP Therapies (Done)       Topic: Physical Therapy (Done)       Point: Mobility training (Done)       Learning Progress Summary            Patient Acceptance, E,D, VU,NR by MS at 7/18/2025 1121    Acceptance, E, NR by AR at 7/17/2025 1254    Acceptance, E, NR by EM at 7/16/2025 1022                      Point: Home exercise program (Done)       Learning Progress Summary            Patient Acceptance, E,D, VU,NR by MS at 7/18/2025 1121    Acceptance, E, NR by AR at 7/17/2025 1254    Acceptance, E, NR by EM at 7/16/2025 1022                      Point: Body mechanics (Done)       Learning Progress Summary            Patient Acceptance, E,D, VU,NR by MS at 7/18/2025 1121    Acceptance, E, NR by AR at 7/17/2025 1254                      Point: Precautions (Done)       Learning Progress Summary            Patient Acceptance, E,D, VU,NR by MS at 7/18/2025 1121    Acceptance, E, NR by AR at 7/17/2025 1254                                      User Key       Initials Effective Dates Name Provider Type Discipline    EM 06/16/21 -   Kayy Adams, PT Physical Therapist PT    MS 06/16/21 -  Campos Costa, PT Physical Therapist PT    AR 06/16/21 -  Mary Curry PT Physical Therapist PT                  PT Recommendation and Plan     Outcome Evaluation: Upon entering room, pt. supine in bed, awake/alert, and agreeable to work with P.T. this date.  This PM, pt. able to ambulate 15 feet at bedside, CGA x 2, with HHA x 2.  Pt. requires Min. assist x 1 for bed mobility and CGA x 2 for sit <-> stand transfers. Cardiac ther. ex. program x 10 reps completed for general strengthening/stretching.  x 2 seated rest breaks required due to decreased in oxygen saturations (72%), limiting his overall gait distance this date.  Will continue to progress functional mobility as tolerated.     Time Calculation:         PT Charges       Row Name 07/18/25 1124             Time Calculation    Start Time 0905  -MS      Stop Time 0920  -MS      Time Calculation (min) 15 min  -MS      PT Received On 07/18/25  -MS      PT - Next Appointment 07/19/25  -MS         Time Calculation- PT    Total Timed Code Minutes- PT 14 minute(s)  -MS                User Key  (r) = Recorded By, (t) = Taken By, (c) = Cosigned By      Initials Name Provider Type    MS Costa, Campos REDDING, PT Physical Therapist                  Therapy Charges for Today       Code Description Service Date Service Provider Modifiers Qty    70491325144  PT THERAPEUTIC ACT EA 15 MIN 7/18/2025 Campos Costa, PT GP 1    88576290033 HC PT THER SUPP EA 15 MIN 7/18/2025 Campos Costa, PT GP 1            PT G-Codes  Outcome Measure Options: AM-PAC 6 Clicks Basic Mobility (PT)  AM-PAC 6 Clicks Score (PT): 17  AM-PAC 6 Clicks Score (OT): 16  Modified Walton Scale: 3 - Moderate disability.  Requiring some help, but able to walk without assistance.       Campos Costa, PT  7/18/2025